# Patient Record
Sex: FEMALE | Race: WHITE | HISPANIC OR LATINO | Employment: STUDENT | URBAN - METROPOLITAN AREA
[De-identification: names, ages, dates, MRNs, and addresses within clinical notes are randomized per-mention and may not be internally consistent; named-entity substitution may affect disease eponyms.]

---
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
## 2022-04-26 ENCOUNTER — PATIENT MESSAGE (OUTPATIENT)
Dept: OBSTETRICS AND GYNECOLOGY | Facility: CLINIC | Age: 22
End: 2022-04-26
Payer: MEDICAID

## 2022-05-11 ENCOUNTER — PATIENT MESSAGE (OUTPATIENT)
Dept: OBSTETRICS AND GYNECOLOGY | Facility: CLINIC | Age: 22
End: 2022-05-11
Payer: MEDICAID

## 2022-05-24 ENCOUNTER — OFFICE VISIT (OUTPATIENT)
Dept: URGENT CARE | Facility: CLINIC | Age: 22
End: 2022-05-24
Payer: MEDICAID

## 2022-05-24 VITALS
OXYGEN SATURATION: 96 % | WEIGHT: 121 LBS | DIASTOLIC BLOOD PRESSURE: 79 MMHG | TEMPERATURE: 98 F | HEART RATE: 98 BPM | RESPIRATION RATE: 20 BRPM | BODY MASS INDEX: 21.43 KG/M2 | SYSTOLIC BLOOD PRESSURE: 112 MMHG

## 2022-05-24 DIAGNOSIS — R11.0 NAUSEA: Primary | ICD-10-CM

## 2022-05-24 DIAGNOSIS — R10.30 LOWER ABDOMINAL PAIN: ICD-10-CM

## 2022-05-24 LAB
B-HCG UR QL: NEGATIVE
BILIRUB UR QL STRIP: NEGATIVE
CTP QC/QA: YES
GLUCOSE UR QL STRIP: NEGATIVE
KETONES UR QL STRIP: POSITIVE
LEUKOCYTE ESTERASE UR QL STRIP: NEGATIVE
PH, POC UA: 6 (ref 5–8)
POC BLOOD, URINE: NEGATIVE
POC NITRATES, URINE: NEGATIVE
PROT UR QL STRIP: NEGATIVE
SP GR UR STRIP: 1.02 (ref 1–1.03)
UROBILINOGEN UR STRIP-ACNC: NORMAL (ref 0.1–1.1)

## 2022-05-24 PROCEDURE — 3078F DIAST BP <80 MM HG: CPT | Mod: CPTII,S$GLB,, | Performed by: FAMILY MEDICINE

## 2022-05-24 PROCEDURE — 81025 URINE PREGNANCY TEST: CPT | Mod: S$GLB,,, | Performed by: FAMILY MEDICINE

## 2022-05-24 PROCEDURE — 81003 POCT URINALYSIS, DIPSTICK, AUTOMATED, W/O SCOPE: ICD-10-PCS | Mod: QW,S$GLB,, | Performed by: FAMILY MEDICINE

## 2022-05-24 PROCEDURE — 1159F MED LIST DOCD IN RCRD: CPT | Mod: CPTII,S$GLB,, | Performed by: FAMILY MEDICINE

## 2022-05-24 PROCEDURE — 3074F PR MOST RECENT SYSTOLIC BLOOD PRESSURE < 130 MM HG: ICD-10-PCS | Mod: CPTII,S$GLB,, | Performed by: FAMILY MEDICINE

## 2022-05-24 PROCEDURE — 99203 OFFICE O/P NEW LOW 30 MIN: CPT | Mod: S$GLB,,, | Performed by: FAMILY MEDICINE

## 2022-05-24 PROCEDURE — 81025 POCT URINE PREGNANCY: ICD-10-PCS | Mod: S$GLB,,, | Performed by: FAMILY MEDICINE

## 2022-05-24 PROCEDURE — 1160F PR REVIEW ALL MEDS BY PRESCRIBER/CLIN PHARMACIST DOCUMENTED: ICD-10-PCS | Mod: CPTII,S$GLB,, | Performed by: FAMILY MEDICINE

## 2022-05-24 PROCEDURE — 1160F RVW MEDS BY RX/DR IN RCRD: CPT | Mod: CPTII,S$GLB,, | Performed by: FAMILY MEDICINE

## 2022-05-24 PROCEDURE — 3008F PR BODY MASS INDEX (BMI) DOCUMENTED: ICD-10-PCS | Mod: CPTII,S$GLB,, | Performed by: FAMILY MEDICINE

## 2022-05-24 PROCEDURE — 1159F PR MEDICATION LIST DOCUMENTED IN MEDICAL RECORD: ICD-10-PCS | Mod: CPTII,S$GLB,, | Performed by: FAMILY MEDICINE

## 2022-05-24 PROCEDURE — 3078F PR MOST RECENT DIASTOLIC BLOOD PRESSURE < 80 MM HG: ICD-10-PCS | Mod: CPTII,S$GLB,, | Performed by: FAMILY MEDICINE

## 2022-05-24 PROCEDURE — 81003 URINALYSIS AUTO W/O SCOPE: CPT | Mod: QW,S$GLB,, | Performed by: FAMILY MEDICINE

## 2022-05-24 PROCEDURE — 3008F BODY MASS INDEX DOCD: CPT | Mod: CPTII,S$GLB,, | Performed by: FAMILY MEDICINE

## 2022-05-24 PROCEDURE — 74019 RADEX ABDOMEN 2 VIEWS: CPT | Mod: FY,S$GLB,, | Performed by: RADIOLOGY

## 2022-05-24 PROCEDURE — 74019 XR ABDOMEN FLAT AND ERECT: ICD-10-PCS | Mod: FY,S$GLB,, | Performed by: RADIOLOGY

## 2022-05-24 PROCEDURE — 99203 PR OFFICE/OUTPT VISIT, NEW, LEVL III, 30-44 MIN: ICD-10-PCS | Mod: S$GLB,,, | Performed by: FAMILY MEDICINE

## 2022-05-24 PROCEDURE — 3074F SYST BP LT 130 MM HG: CPT | Mod: CPTII,S$GLB,, | Performed by: FAMILY MEDICINE

## 2022-05-24 RX ORDER — DICYCLOMINE HYDROCHLORIDE 20 MG/1
20 TABLET ORAL
Status: COMPLETED | OUTPATIENT
Start: 2022-05-24 | End: 2022-05-24

## 2022-05-24 RX ORDER — ONDANSETRON 4 MG/1
4 TABLET, ORALLY DISINTEGRATING ORAL EVERY 6 HOURS PRN
Qty: 15 TABLET | Refills: 0 | Status: SHIPPED | OUTPATIENT
Start: 2022-05-24 | End: 2022-07-16

## 2022-05-24 RX ORDER — DICYCLOMINE HYDROCHLORIDE 20 MG/1
20 TABLET ORAL EVERY 6 HOURS
Qty: 30 TABLET | Refills: 1 | Status: SHIPPED | OUTPATIENT
Start: 2022-05-24 | End: 2022-06-23

## 2022-05-24 RX ADMIN — DICYCLOMINE HYDROCHLORIDE 20 MG: 20 TABLET ORAL at 10:05

## 2022-05-24 NOTE — PROGRESS NOTES
Subjective:       Patient ID: Soila Ridley is a 22 y.o. female.    Vitals:  weight is 54.9 kg (121 lb). Her temperature is 98.4 °F (36.9 °C). Her blood pressure is 112/79 and her pulse is 98. Her respiration is 20 and oxygen saturation is 96%.     Chief Complaint: Nausea    Pt is complaining of bad stomach pain that started yesterday.  States had meal outside day before and since then having crampy, sharp pain in mid abdominal area, no radiation, no relation to food,  No diarrhea  No previous hx of  deneis f/c      Abdominal Pain  This is a new problem. The current episode started yesterday. The pain is at a severity of 0/10. The patient is experiencing no pain. Associated symptoms include headaches, myalgias, nausea and vomiting. Patient's medical history does not include UTI.       Gastrointestinal: Positive for abdominal pain, nausea and vomiting.   Musculoskeletal: Positive for muscle ache.   Neurological: Positive for headaches.       Objective:      Physical Exam   Constitutional: She is oriented to person, place, and time. She appears well-developed. She is cooperative.  Non-toxic appearance. She does not appear ill. No distress.   HENT:   Head: Normocephalic and atraumatic.   Ears:   Right Ear: Hearing, tympanic membrane, external ear and ear canal normal.   Left Ear: Hearing, tympanic membrane, external ear and ear canal normal.   Nose: Nose normal. No mucosal edema, rhinorrhea or nasal deformity. No epistaxis. Right sinus exhibits no maxillary sinus tenderness and no frontal sinus tenderness. Left sinus exhibits no maxillary sinus tenderness and no frontal sinus tenderness.   Mouth/Throat: Uvula is midline, oropharynx is clear and moist and mucous membranes are normal. Mucous membranes are moist. No trismus in the jaw. Normal dentition. No uvula swelling. No posterior oropharyngeal erythema. Oropharynx is clear.   Eyes: Conjunctivae and lids are normal. Pupils are equal, round, and reactive to light.  Right eye exhibits no discharge. Left eye exhibits no discharge. No scleral icterus. Extraocular movement intact   Neck: Trachea normal and phonation normal. Neck supple.   Cardiovascular: Normal rate, regular rhythm, normal heart sounds and normal pulses.   Pulmonary/Chest: Effort normal and breath sounds normal. No respiratory distress.   Abdominal: Normal appearance and bowel sounds are normal. She exhibits no distension, no pulsatile midline mass and no mass. Soft. There is no rebound and no guarding. No hernia.   Musculoskeletal: Normal range of motion.         General: No deformity. Normal range of motion.   Neurological: She is alert and oriented to person, place, and time. She exhibits normal muscle tone. Coordination normal.   Skin: Skin is warm, dry, intact, not diaphoretic and not pale.   Psychiatric: Her speech is normal and behavior is normal. Judgment and thought content normal.   Nursing note and vitals reviewed.        Assessment:       1. Nausea    2. Lower abdominal pain          Plan:         Nausea  -     POCT Urinalysis, Dipstick, Automated, W/O Scope  -     POCT urine pregnancy  -     X-Ray Abdomen Flat And Erect; Future; Expected date: 05/24/2022    Lower abdominal pain  -     X-Ray Abdomen Flat And Erect; Future; Expected date: 05/24/2022    Other orders  -     dicyclomine tablet 20 mg  -     dicyclomine (BENTYL) 20 mg tablet; Take 1 tablet (20 mg total) by mouth every 6 (six) hours.  Dispense: 30 tablet; Refill: 1  -     ondansetron (ZOFRAN-ODT) 4 MG TbDL; Take 1 tablet (4 mg total) by mouth every 6 (six) hours as needed.  Dispense: 15 tablet; Refill: 0          Pt advised to go to ER if persistent abdominal or worsening of sx

## 2022-07-05 ENCOUNTER — PATIENT MESSAGE (OUTPATIENT)
Dept: OBSTETRICS AND GYNECOLOGY | Facility: CLINIC | Age: 22
End: 2022-07-05
Payer: MEDICAID

## 2022-07-06 ENCOUNTER — PATIENT MESSAGE (OUTPATIENT)
Dept: OBSTETRICS AND GYNECOLOGY | Facility: CLINIC | Age: 22
End: 2022-07-06
Payer: MEDICAID

## 2022-07-07 ENCOUNTER — PATIENT MESSAGE (OUTPATIENT)
Dept: OBSTETRICS AND GYNECOLOGY | Facility: CLINIC | Age: 22
End: 2022-07-07
Payer: MEDICAID

## 2022-07-16 ENCOUNTER — OFFICE VISIT (OUTPATIENT)
Dept: URGENT CARE | Facility: CLINIC | Age: 22
End: 2022-07-16
Payer: MEDICAID

## 2022-07-16 VITALS
BODY MASS INDEX: 20.49 KG/M2 | SYSTOLIC BLOOD PRESSURE: 95 MMHG | WEIGHT: 120 LBS | HEART RATE: 77 BPM | RESPIRATION RATE: 18 BRPM | HEIGHT: 64 IN | DIASTOLIC BLOOD PRESSURE: 66 MMHG | OXYGEN SATURATION: 98 % | TEMPERATURE: 99 F

## 2022-07-16 DIAGNOSIS — L08.9 INFECTED ABRASION OF GREAT TOE OF LEFT FOOT, INITIAL ENCOUNTER: Primary | ICD-10-CM

## 2022-07-16 DIAGNOSIS — S90.412A INFECTED ABRASION OF GREAT TOE OF LEFT FOOT, INITIAL ENCOUNTER: Primary | ICD-10-CM

## 2022-07-16 DIAGNOSIS — L03.032 PARONYCHIA OF GREAT TOE OF LEFT FOOT: ICD-10-CM

## 2022-07-16 PROCEDURE — 3078F DIAST BP <80 MM HG: CPT | Mod: CPTII,S$GLB,, | Performed by: FAMILY MEDICINE

## 2022-07-16 PROCEDURE — 3008F BODY MASS INDEX DOCD: CPT | Mod: CPTII,S$GLB,, | Performed by: FAMILY MEDICINE

## 2022-07-16 PROCEDURE — 3008F PR BODY MASS INDEX (BMI) DOCUMENTED: ICD-10-PCS | Mod: CPTII,S$GLB,, | Performed by: FAMILY MEDICINE

## 2022-07-16 PROCEDURE — 1159F PR MEDICATION LIST DOCUMENTED IN MEDICAL RECORD: ICD-10-PCS | Mod: CPTII,S$GLB,, | Performed by: FAMILY MEDICINE

## 2022-07-16 PROCEDURE — 3074F PR MOST RECENT SYSTOLIC BLOOD PRESSURE < 130 MM HG: ICD-10-PCS | Mod: CPTII,S$GLB,, | Performed by: FAMILY MEDICINE

## 2022-07-16 PROCEDURE — 3078F PR MOST RECENT DIASTOLIC BLOOD PRESSURE < 80 MM HG: ICD-10-PCS | Mod: CPTII,S$GLB,, | Performed by: FAMILY MEDICINE

## 2022-07-16 PROCEDURE — 99213 OFFICE O/P EST LOW 20 MIN: CPT | Mod: S$GLB,,, | Performed by: FAMILY MEDICINE

## 2022-07-16 PROCEDURE — 99213 PR OFFICE/OUTPT VISIT, EST, LEVL III, 20-29 MIN: ICD-10-PCS | Mod: S$GLB,,, | Performed by: FAMILY MEDICINE

## 2022-07-16 PROCEDURE — 3074F SYST BP LT 130 MM HG: CPT | Mod: CPTII,S$GLB,, | Performed by: FAMILY MEDICINE

## 2022-07-16 PROCEDURE — 1159F MED LIST DOCD IN RCRD: CPT | Mod: CPTII,S$GLB,, | Performed by: FAMILY MEDICINE

## 2022-07-16 RX ORDER — CEPHALEXIN 500 MG/1
500 CAPSULE ORAL EVERY 6 HOURS
Qty: 28 CAPSULE | Refills: 0 | Status: SHIPPED | OUTPATIENT
Start: 2022-07-16 | End: 2022-07-23

## 2022-07-16 RX ORDER — DICYCLOMINE HYDROCHLORIDE 10 MG/1
10 CAPSULE ORAL
COMMUNITY

## 2022-07-16 NOTE — PROGRESS NOTES
"Subjective:       Patient ID: Soila Ridley is a 22 y.o. female.    Vitals:  height is 5' 4" (1.626 m) and weight is 54.4 kg (120 lb). Her temperature is 98.6 °F (37 °C). Her blood pressure is 95/66 and her pulse is 77. Her respiration is 18 and oxygen saturation is 98%.     Chief Complaint: Nail Problem    Patient presents to clinic with left great toenail infection/swelling/draining x 3 days.Does not recall any contusion, or fall or any other trauma      Nail Problem  This is a new problem. The current episode started in the past 7 days. The problem has been gradually worsening. Pertinent negatives include no numbness. The symptoms are aggravated by walking. Treatments tried: triple antibiotic ointment. The treatment provided no relief.       Neurological: Negative for numbness.       Objective:      Physical Exam   Constitutional: She is oriented to person, place, and time. She appears well-developed. She is cooperative.  Non-toxic appearance. She does not appear ill. No distress.   HENT:   Head: Normocephalic and atraumatic.   Ears:   Right Ear: Hearing, tympanic membrane, external ear and ear canal normal.   Left Ear: Hearing, tympanic membrane, external ear and ear canal normal.   Nose: Nose normal. No mucosal edema, rhinorrhea or nasal deformity. No epistaxis. Right sinus exhibits no maxillary sinus tenderness and no frontal sinus tenderness. Left sinus exhibits no maxillary sinus tenderness and no frontal sinus tenderness.   Mouth/Throat: Uvula is midline, oropharynx is clear and moist and mucous membranes are normal. Mucous membranes are moist. No trismus in the jaw. Normal dentition. No uvula swelling. No oropharyngeal exudate. Oropharynx is clear.   Eyes: Conjunctivae and lids are normal. Pupils are equal, round, and reactive to light. Right eye exhibits no discharge. Left eye exhibits no discharge. No scleral icterus. Extraocular movement intact   Neck: Trachea normal and phonation normal. Neck supple. "   Cardiovascular: Normal rate, regular rhythm, normal heart sounds and normal pulses.   Pulmonary/Chest: Effort normal and breath sounds normal. No respiratory distress.   Abdominal: Normal appearance and bowel sounds are normal. She exhibits no distension, no pulsatile midline mass and no mass. Soft. There is no abdominal tenderness.   Musculoskeletal: Normal range of motion.         General: No deformity. Normal range of motion.      Comments: Left big toe with minimal swelling, mild clear drainage from base of nail, around matrix  No FB,    Moderate tenderness on palpation     Neurological: She is alert and oriented to person, place, and time. She exhibits normal muscle tone. Coordination normal.   Skin: Skin is warm, dry, intact, not diaphoretic and not pale.   Psychiatric: Her speech is normal and behavior is normal. Judgment and thought content normal.   Nursing note and vitals reviewed.        Assessment:       1. Infected abrasion of great toe of left foot, initial encounter    2. Paronychia of great toe of left foot          Plan:         Infected abrasion of great toe of left foot, initial encounter    Paronychia of great toe of left foot    Other orders  -     cephALEXin (KEFLEX) 500 MG capsule; Take 1 capsule (500 mg total) by mouth every 6 (six) hours. for 7 days  Dispense: 28 capsule; Refill: 0              Advised warm soaks

## 2022-08-02 ENCOUNTER — OFFICE VISIT (OUTPATIENT)
Dept: OBSTETRICS AND GYNECOLOGY | Facility: CLINIC | Age: 22
End: 2022-08-02
Payer: MEDICAID

## 2022-08-02 VITALS
SYSTOLIC BLOOD PRESSURE: 100 MMHG | WEIGHT: 120.56 LBS | BODY MASS INDEX: 20.58 KG/M2 | HEIGHT: 64 IN | DIASTOLIC BLOOD PRESSURE: 62 MMHG

## 2022-08-02 DIAGNOSIS — Z01.419 ENCOUNTER FOR WELL WOMAN EXAM WITH ROUTINE GYNECOLOGICAL EXAM: Primary | ICD-10-CM

## 2022-08-02 DIAGNOSIS — Z30.011 ENCOUNTER FOR INITIAL PRESCRIPTION OF CONTRACEPTIVE PILLS: ICD-10-CM

## 2022-08-02 PROCEDURE — 99395 PREV VISIT EST AGE 18-39: CPT | Mod: S$PBB,,, | Performed by: OBSTETRICS & GYNECOLOGY

## 2022-08-02 PROCEDURE — 99999 PR PBB SHADOW E&M-EST. PATIENT-LVL II: ICD-10-PCS | Mod: PBBFAC,,, | Performed by: OBSTETRICS & GYNECOLOGY

## 2022-08-02 PROCEDURE — 99395 PR PREVENTIVE VISIT,EST,18-39: ICD-10-PCS | Mod: S$PBB,,, | Performed by: OBSTETRICS & GYNECOLOGY

## 2022-08-02 PROCEDURE — 99212 OFFICE O/P EST SF 10 MIN: CPT | Mod: PBBFAC | Performed by: OBSTETRICS & GYNECOLOGY

## 2022-08-02 PROCEDURE — 3074F PR MOST RECENT SYSTOLIC BLOOD PRESSURE < 130 MM HG: ICD-10-PCS | Mod: CPTII,,, | Performed by: OBSTETRICS & GYNECOLOGY

## 2022-08-02 PROCEDURE — 3074F SYST BP LT 130 MM HG: CPT | Mod: CPTII,,, | Performed by: OBSTETRICS & GYNECOLOGY

## 2022-08-02 PROCEDURE — 3008F BODY MASS INDEX DOCD: CPT | Mod: CPTII,,, | Performed by: OBSTETRICS & GYNECOLOGY

## 2022-08-02 PROCEDURE — 1159F PR MEDICATION LIST DOCUMENTED IN MEDICAL RECORD: ICD-10-PCS | Mod: CPTII,,, | Performed by: OBSTETRICS & GYNECOLOGY

## 2022-08-02 PROCEDURE — 3078F DIAST BP <80 MM HG: CPT | Mod: CPTII,,, | Performed by: OBSTETRICS & GYNECOLOGY

## 2022-08-02 PROCEDURE — 99999 PR PBB SHADOW E&M-EST. PATIENT-LVL II: CPT | Mod: PBBFAC,,, | Performed by: OBSTETRICS & GYNECOLOGY

## 2022-08-02 PROCEDURE — 1159F MED LIST DOCD IN RCRD: CPT | Mod: CPTII,,, | Performed by: OBSTETRICS & GYNECOLOGY

## 2022-08-02 PROCEDURE — 3078F PR MOST RECENT DIASTOLIC BLOOD PRESSURE < 80 MM HG: ICD-10-PCS | Mod: CPTII,,, | Performed by: OBSTETRICS & GYNECOLOGY

## 2022-08-02 PROCEDURE — 3008F PR BODY MASS INDEX (BMI) DOCUMENTED: ICD-10-PCS | Mod: CPTII,,, | Performed by: OBSTETRICS & GYNECOLOGY

## 2022-08-02 RX ORDER — NORETHINDRONE ACETATE AND ETHINYL ESTRADIOL 1.5-30(21)
1 KIT ORAL DAILY
Qty: 90 TABLET | Refills: 4 | Status: SHIPPED | OUTPATIENT
Start: 2022-08-02 | End: 2023-02-08

## 2022-08-02 NOTE — PROGRESS NOTES
History & Physical  Gynecology      SUBJECTIVE:     Chief Complaint: Gynecologic Exam       History of Present Illness:    oSila Ridley is a 22 y.o. female  here for annual routine Pap and checkup. No LMP recorded..  She has no unusual complaints.      Pt is on OCPs for contraception.  Pt reports that she is having heavy bleeding during her menses.  Only happening during her off pills.  Pt doesn't want a low dose pill because she lost too much weight with the lower dose pills.  Bleeding will last 5-6 days.  Overall is happy with medication.  Stopped breastfeeding 6 months ago.    denies break through bleeding.   denies vaginal itching or irritation.  denies vaginal discharge.    She is sexually active with 1 partner  She uses oral contraceptives (estrogen/progesterone) for contraception.    History of abnormal pap: No  Last Pap: N/A  Last MMG: No  Last Colonoscopy:  No      Review of patient's allergies indicates:  No Known Allergies    Past Medical History:   Diagnosis Date    Bronchitis 2014     Past Surgical History:   Procedure Laterality Date    KNEE SURGERY       OB History        1    Para   1    Term   1       0    AB   0    Living   1       SAB   0    IAB   0    Ectopic   0    Multiple   0    Live Births   1               Family History   Problem Relation Age of Onset    Diabetes Mother     No Known Problems Father      Social History     Tobacco Use    Smoking status: Never Smoker    Smokeless tobacco: Never Used   Substance Use Topics    Alcohol use: No    Drug use: No       Current Outpatient Medications   Medication Sig    dicyclomine (BENTYL) 10 MG capsule Take 10 mg by mouth 4 (four) times daily before meals and nightly.    norethindrone (ORTHO MICRONOR) 0.35 mg tablet Take 1 tablet (0.35 mg total) by mouth once daily. (Patient not taking: No sig reported)    norethindrone-ethinyl estradiol-iron (MICROGESTIN FE1.5/30) 1.5 mg-30 mcg (21)/75 mg (7) tablet Take 1 tablet  by mouth once daily.     No current facility-administered medications for this visit.         Review of Systems:  Review of Systems   Constitutional: Negative for activity change, appetite change, chills, fatigue, fever and unexpected weight change.   Respiratory: Negative for cough, shortness of breath and wheezing.    Cardiovascular: Negative for chest pain and leg swelling.   Gastrointestinal: Negative for abdominal pain, constipation, diarrhea, nausea and vomiting.   Endocrine: Negative for hair loss and hot flashes.   Genitourinary: Negative for decreased libido, dyspareunia, dysuria, frequency, menstrual problem, pelvic pain, vaginal bleeding, vaginal discharge and vaginal pain.   Integumentary:  Negative for acne, hair changes, nipple discharge and breast skin changes.   Neurological: Negative for headaches.   Psychiatric/Behavioral: Negative for sleep disturbance.   Breast: Negative for mastodynia, nipple discharge and skin changes       OBJECTIVE:     Physical Exam:  Physical Exam  Constitutional:       Appearance: She is well-developed.   HENT:      Head: Normocephalic and atraumatic.   Eyes:      General: No scleral icterus.        Right eye: No discharge.         Left eye: No discharge.      Conjunctiva/sclera: Conjunctivae normal.   Pulmonary:      Effort: Pulmonary effort is normal.      Breath sounds: No stridor.   Chest:      Chest wall: No mass or tenderness.   Breasts: Breasts are symmetrical.      Right: No inverted nipple, mass, nipple discharge, skin change or tenderness.      Left: No inverted nipple, mass, nipple discharge, skin change or tenderness.       Abdominal:      General: There is no distension.      Palpations: Abdomen is soft.      Tenderness: There is no abdominal tenderness.   Genitourinary:     Labia:         Right: No rash, tenderness, lesion or injury.         Left: No rash, tenderness, lesion or injury.       Vagina: Normal.      Cervix: No cervical motion tenderness,  discharge or friability.      Adnexa:         Right: No mass, tenderness or fullness.          Left: No mass, tenderness or fullness.        Comments: Normal external genitalia.  Normal hair distribution.  Urethral meatus normal. No cervical lesions or masses.  No vaginal bleeding noted.  No adnexal or uterine tenderness.  No palpable adnexal masses.  Musculoskeletal:         General: Normal range of motion.   Skin:     General: Skin is warm and dry.   Neurological:      Mental Status: She is alert and oriented to person, place, and time.   Psychiatric:         Behavior: Behavior normal.         Thought Content: Thought content normal.         Judgment: Judgment normal.           ASSESSMENT:       ICD-10-CM ICD-9-CM    1. Encounter for well woman exam with routine gynecological exam  Z01.419 V72.31    2. Encounter for initial prescription of contraceptive pills  Z30.011 V25.01 norethindrone-ethinyl estradiol-iron (MICROGESTIN FE1.5/30) 1.5 mg-30 mcg (21)/75 mg (7) tablet          Plan:      Soila was seen today for gynecologic exam.    Diagnoses and all orders for this visit:    Encounter for well woman exam with routine gynecological exam  - Pap smear starting next year at age 23  - MMG and Cscope not indicated at this time      Encounter for initial prescription of contraceptive pills  - Pt having heavy bleeding but reports that she is overall happy with the medication   - Will send Rx for OCPs.  -     norethindrone-ethinyl estradiol-iron (MICROGESTIN FE1.5/30) 1.5 mg-30 mcg (21)/75 mg (7) tablet; Take 1 tablet by mouth once daily.        No orders of the defined types were placed in this encounter.      Follow up in about 1 year (around 8/2/2023) for annual.    Counseling time: 15 minutes    Celian Hardin

## 2023-02-03 ENCOUNTER — HOSPITAL ENCOUNTER (EMERGENCY)
Facility: HOSPITAL | Age: 23
Discharge: HOME | End: 2023-02-03
Attending: EMERGENCY MEDICINE
Payer: MEDICAID

## 2023-02-03 ENCOUNTER — ANESTHESIA EVENT (OUTPATIENT)
Dept: EMERGENCY MEDICINE | Facility: HOSPITAL | Age: 23
End: 2023-02-03
Payer: MEDICAID

## 2023-02-03 ENCOUNTER — ANESTHESIA (OUTPATIENT)
Dept: EMERGENCY MEDICINE | Facility: HOSPITAL | Age: 23
End: 2023-02-03
Payer: MEDICAID

## 2023-02-03 VITALS
HEART RATE: 98 BPM | SYSTOLIC BLOOD PRESSURE: 105 MMHG | OXYGEN SATURATION: 98 % | TEMPERATURE: 98 F | DIASTOLIC BLOOD PRESSURE: 70 MMHG | RESPIRATION RATE: 17 BRPM | BODY MASS INDEX: 20.08 KG/M2 | WEIGHT: 117 LBS

## 2023-02-03 DIAGNOSIS — G97.1 POST LUMBAR PUNCTURE HEADACHE: Primary | ICD-10-CM

## 2023-02-03 PROCEDURE — 96375 TX/PRO/DX INJ NEW DRUG ADDON: CPT

## 2023-02-03 PROCEDURE — 25000003 PHARM REV CODE 250: Performed by: EMERGENCY MEDICINE

## 2023-02-03 PROCEDURE — 62273 EPIDURAL: ICD-10-PCS | Mod: ,,, | Performed by: ANESTHESIOLOGY

## 2023-02-03 PROCEDURE — 96361 HYDRATE IV INFUSION ADD-ON: CPT | Mod: 59

## 2023-02-03 PROCEDURE — 63600175 PHARM REV CODE 636 W HCPCS: Performed by: EMERGENCY MEDICINE

## 2023-02-03 PROCEDURE — 62273 INJECT EPIDURAL PATCH: CPT | Performed by: ANESTHESIOLOGY

## 2023-02-03 PROCEDURE — 96374 THER/PROPH/DIAG INJ IV PUSH: CPT

## 2023-02-03 PROCEDURE — 99284 EMERGENCY DEPT VISIT MOD MDM: CPT | Mod: 25

## 2023-02-03 RX ORDER — ONDANSETRON 2 MG/ML
4 INJECTION INTRAMUSCULAR; INTRAVENOUS
Status: COMPLETED | OUTPATIENT
Start: 2023-02-03 | End: 2023-02-03

## 2023-02-03 RX ORDER — ONDANSETRON 8 MG/1
8 TABLET, ORALLY DISINTEGRATING ORAL EVERY 8 HOURS PRN
Qty: 12 TABLET | Refills: 0 | Status: SHIPPED | OUTPATIENT
Start: 2023-02-03 | End: 2023-08-11

## 2023-02-03 RX ORDER — KETOROLAC TROMETHAMINE 30 MG/ML
15 INJECTION, SOLUTION INTRAMUSCULAR; INTRAVENOUS
Status: COMPLETED | OUTPATIENT
Start: 2023-02-03 | End: 2023-02-03

## 2023-02-03 RX ORDER — BUTALBITAL, ACETAMINOPHEN AND CAFFEINE 50; 325; 40 MG/1; MG/1; MG/1
1 TABLET ORAL
Status: COMPLETED | OUTPATIENT
Start: 2023-02-03 | End: 2023-02-03

## 2023-02-03 RX ORDER — IBUPROFEN 800 MG/1
800 TABLET ORAL EVERY 8 HOURS PRN
Qty: 30 TABLET | Refills: 0 | Status: SHIPPED | OUTPATIENT
Start: 2023-02-03 | End: 2023-08-11

## 2023-02-03 RX ORDER — ACETAMINOPHEN 500 MG
1000 TABLET ORAL
Status: COMPLETED | OUTPATIENT
Start: 2023-02-03 | End: 2023-02-03

## 2023-02-03 RX ADMIN — BUTALBITAL, ACETAMINOPHEN, AND CAFFEINE 1 TABLET: 50; 325; 40 TABLET ORAL at 12:02

## 2023-02-03 RX ADMIN — SODIUM CHLORIDE 1000 ML: 0.9 INJECTION, SOLUTION INTRAVENOUS at 12:02

## 2023-02-03 RX ADMIN — ONDANSETRON HYDROCHLORIDE 4 MG: 2 INJECTION, SOLUTION INTRAMUSCULAR; INTRAVENOUS at 12:02

## 2023-02-03 RX ADMIN — KETOROLAC TROMETHAMINE 15 MG: 30 INJECTION, SOLUTION INTRAMUSCULAR; INTRAVENOUS at 12:02

## 2023-02-03 RX ADMIN — ACETAMINOPHEN 1000 MG: 500 TABLET ORAL at 03:02

## 2023-02-03 NOTE — ED NOTES
Anestheisa at bedside performing blood patch procedure. Pt c/o back cramping and chill. Pt tolerated procedure well. Instructed pt she needs to lie flat for 2 hours. Pt verbalize understanding.

## 2023-02-03 NOTE — ED NOTES
Present to ED with headache for 3 days after having an LP. Reports taking Nortec and drinking a coke to help with the headache. Denies visual changes. +nausea.

## 2023-02-03 NOTE — ED PROVIDER NOTES
Encounter Date: 2/3/2023     History     Chief Complaint   Patient presents with    Headache     Pt had a LP done in [place] 3 days ago and is now complaining of a really bad headache and nausea. Pt states that standing makes it worse.      Patient presents to emergency department complaining of a headache after having an LP done 3 days ago.  She complains of a constant generalized headache that has been worsening over the past 3 days.  She reports it is associated with nausea but no vomiting.  No fevers or neck stiffness.  Patient has a history of my insulin reports this feels similar.    Review of patient's allergies indicates:  No Known Allergies  Past Medical History:   Diagnosis Date    Bronchitis 2014     Past Surgical History:   Procedure Laterality Date    KNEE SURGERY       Family History   Problem Relation Age of Onset    Diabetes Mother     No Known Problems Father      Social History     Tobacco Use    Smoking status: Never    Smokeless tobacco: Never   Substance Use Topics    Alcohol use: No    Drug use: No     Review of Systems   Neurological:  Positive for headaches.   All other systems reviewed and are negative.    Physical Exam     Initial Vitals [02/03/23 1125]   BP Pulse Resp Temp SpO2   107/68 90 16 98.1 °F (36.7 °C) 97 %      MAP       --         Vitals:    02/03/23 1125   BP: 107/68   Pulse: 90   Resp: 16   Temp: 98.1 °F (36.7 °C)   TempSrc: Oral   SpO2: 97%   Weight: 53.1 kg (117 lb)     Physical Exam    Constitutional: She appears well-developed and well-nourished.   HENT:   Head: Normocephalic and atraumatic.   Eyes: EOM are normal. Pupils are equal, round, and reactive to light.   Neck:   Normal range of motion.  Cardiovascular:  Normal rate, regular rhythm and normal heart sounds.           Pulmonary/Chest: Breath sounds normal. No respiratory distress. She has no wheezes.   Abdominal: Abdomen is soft. She exhibits no distension. There is no abdominal tenderness.   Musculoskeletal:          General: No edema. Normal range of motion.      Cervical back: Normal range of motion.     Neurological: She is alert and oriented to person, place, and time. No cranial nerve deficit.   Skin: Skin is warm and dry.   Psychiatric: She has a normal mood and affect. Thought content normal.       ED Course   Procedures                 MDM  History Acquisition   Additional history was not acquired from other historians.  The patient's list of active medical problems, social history, medications, and allergies as documented per RN staff has been reviewed.     Differential Diagnoses   Based on available information and the initial assessment, the working differential diagnoses considered during this evaluation include but are not limited to post LP headache, migraine, meningitis .      LABS   Labs Reviewed - No data to display  Notable findings from our independent review of the labs ordered include NA.    Imaging     Imaging Results    None          A radiology report was not available for my review at the time of the encounter.    EKG        Additional Consideration   Additional testing NA was not considered during the course of this workup.  Comorbidities taken into consideration during the patient's evaluation and treatment include NA.    Social determinants of health taken into consideration during development of our treatment plan include NA.    Medications   acetaminophen tablet 1,000 mg (has no administration in time range)   sodium chloride 0.9% bolus 1,000 mL 1,000 mL (1,000 mLs Intravenous New Bag 2/3/23 1222)   ondansetron injection 4 mg (4 mg Intravenous Given 2/3/23 1211)   ketorolac injection 15 mg (15 mg Intravenous Given 2/3/23 1211)   butalbital-acetaminophen-caffeine -40 mg per tablet 1 tablet (1 tablet Oral Given 2/3/23 1211)                 ED Management/Discussion     Patient presented with symptoms consistent with post LP headache.  Anesthesia was consulted and came to the emergency department  to perform a blood patch.  Patient was given Toradol Fioricet Zofran and IV fluids.  She reports her symptoms have significantly improved.  She is appropriate for discharge at this time      CLINICAL IMPRESSION  1. Post lumbar puncture headache         ED Disposition Condition    Discharge Stable              Kenny Domingo MD  02/03/23 1500

## 2023-02-03 NOTE — ANESTHESIA PREPROCEDURE EVALUATION
02/03/2023  Soila Ridley is a 22 y.o., female 3 days s/p LP in [place]. Presented to ED with c/o headache, 10/10 sitting, 0/10 supine. Associated N/V, denies neck pain or stiffness, photophobia or hypacusis. She has taken pain medication at home without relief, tried caffeine but is vomiting. Discussed PDPH conservative management vs EBP & patient would like to proceed with blood patch. Risks, benefits discussed, consents signed.   Richelle Ivey MD      Pre-op Assessment          Review of Systems          .

## 2023-02-03 NOTE — ANESTHESIA PROCEDURE NOTES
Epidural    Patient location during procedure: ED  Block not for primary anesthetic.  Reason for block: post dural puncture headache   Post-op Pain Location: HA b/l  Start time: 2/3/2023 12:30 PM  Timeout: 2/3/2023 12:30 PM  End time: 2/3/2023 12:40 PM    Staffing  Performing Provider: Richelle Ivey MD  Authorizing Provider: Richelle Ivey MD        Preanesthetic Checklist  Completed: patient identified, IV checked, risks and benefits discussed, pre-op evaluation, timeout performed, anesthesia consent given and hand hygiene performed  Preparation  Patient position: sitting  Prep: ChloraPrep  Patient monitoring: Pulse Ox and Blood Pressure Block not for primary anesthetic.  Epidural  Skin Anesthetic: lidocaine 1%  Skin Wheal: 3 mL  Administration type: single shot  Approach: midline  Interspace: L4-5    Injection technique: GHAZAL saline  Needle and Epidural Catheter  Needle type: Tuohy   Needle gauge: 17  Needle length: 3.5 inches  Insertion Attempts: 1  Needle localization: anatomical landmarks  Additional Notes  L AC prepped & draped in sterile fashion, sterile 20G PIV inserted. Upon GHAZAL 20mL sterile blood aspirated and slowly injected into epidural space. Patient noted back & buttock cramping towards end of procedure, tolerated well. Instructed patient to lay flat 2 hours post-procedure. HA reported improved to 2/10 following procedure.

## 2023-02-09 ENCOUNTER — PATIENT MESSAGE (OUTPATIENT)
Dept: OBSTETRICS AND GYNECOLOGY | Facility: CLINIC | Age: 23
End: 2023-02-09
Payer: MEDICAID

## 2023-03-27 ENCOUNTER — PATIENT MESSAGE (OUTPATIENT)
Dept: NEUROLOGY | Facility: CLINIC | Age: 23
End: 2023-03-27
Payer: MEDICAID

## 2023-03-27 ENCOUNTER — TELEPHONE (OUTPATIENT)
Dept: NEUROLOGY | Facility: CLINIC | Age: 23
End: 2023-03-27
Payer: MEDICAID

## 2023-04-11 ENCOUNTER — TELEPHONE (OUTPATIENT)
Dept: NEUROLOGY | Facility: CLINIC | Age: 23
End: 2023-04-11
Payer: MEDICAID

## 2023-04-11 NOTE — TELEPHONE ENCOUNTER
----- Message from Carolin Llanos sent at 4/11/2023  2:08 PM CDT -----  Regarding: Pt Advice  Contact: Pt [phone]  Pt is calling stated that provider is relocating . Please Clarify and Call

## 2023-05-11 ENCOUNTER — LAB VISIT (OUTPATIENT)
Dept: LAB | Facility: HOSPITAL | Age: 23
End: 2023-05-11
Attending: STUDENT IN AN ORGANIZED HEALTH CARE EDUCATION/TRAINING PROGRAM
Payer: MEDICAID

## 2023-05-11 ENCOUNTER — OFFICE VISIT (OUTPATIENT)
Dept: NEUROLOGY | Facility: CLINIC | Age: 23
End: 2023-05-11
Payer: MEDICAID

## 2023-05-11 VITALS
DIASTOLIC BLOOD PRESSURE: 75 MMHG | SYSTOLIC BLOOD PRESSURE: 109 MMHG | WEIGHT: 117.06 LBS | HEART RATE: 81 BPM | BODY MASS INDEX: 19.99 KG/M2 | HEIGHT: 64 IN

## 2023-05-11 DIAGNOSIS — R93.0 RADIOLOGICALLY ISOLATED SYNDROME: ICD-10-CM

## 2023-05-11 DIAGNOSIS — G35 MULTIPLE SCLEROSIS: ICD-10-CM

## 2023-05-11 DIAGNOSIS — F40.240 CLAUSTROPHOBIA: Primary | ICD-10-CM

## 2023-05-11 LAB — 25(OH)D3+25(OH)D2 SERPL-MCNC: 41 NG/ML (ref 30–96)

## 2023-05-11 PROCEDURE — G2211 COMPLEX E/M VISIT ADD ON: HCPCS | Mod: S$PBB,,, | Performed by: STUDENT IN AN ORGANIZED HEALTH CARE EDUCATION/TRAINING PROGRAM

## 2023-05-11 PROCEDURE — 82607 VITAMIN B-12: CPT | Performed by: STUDENT IN AN ORGANIZED HEALTH CARE EDUCATION/TRAINING PROGRAM

## 2023-05-11 PROCEDURE — 82306 VITAMIN D 25 HYDROXY: CPT | Performed by: STUDENT IN AN ORGANIZED HEALTH CARE EDUCATION/TRAINING PROGRAM

## 2023-05-11 PROCEDURE — 3078F DIAST BP <80 MM HG: CPT | Mod: CPTII,,, | Performed by: STUDENT IN AN ORGANIZED HEALTH CARE EDUCATION/TRAINING PROGRAM

## 2023-05-11 PROCEDURE — 83921 ORGANIC ACID SINGLE QUANT: CPT | Performed by: STUDENT IN AN ORGANIZED HEALTH CARE EDUCATION/TRAINING PROGRAM

## 2023-05-11 PROCEDURE — 99999 PR PBB SHADOW E&M-EST. PATIENT-LVL IV: CPT | Mod: PBBFAC,,, | Performed by: STUDENT IN AN ORGANIZED HEALTH CARE EDUCATION/TRAINING PROGRAM

## 2023-05-11 PROCEDURE — 99205 OFFICE O/P NEW HI 60 MIN: CPT | Mod: S$PBB,,, | Performed by: STUDENT IN AN ORGANIZED HEALTH CARE EDUCATION/TRAINING PROGRAM

## 2023-05-11 PROCEDURE — 86363 MOG-IGG1 ANTB FLO CYTMTRY EA: CPT | Performed by: STUDENT IN AN ORGANIZED HEALTH CARE EDUCATION/TRAINING PROGRAM

## 2023-05-11 PROCEDURE — 86053 AQAPRN-4 ANTB FLO CYTMTRY EA: CPT | Performed by: STUDENT IN AN ORGANIZED HEALTH CARE EDUCATION/TRAINING PROGRAM

## 2023-05-11 PROCEDURE — 99214 OFFICE O/P EST MOD 30 MIN: CPT | Mod: PBBFAC | Performed by: STUDENT IN AN ORGANIZED HEALTH CARE EDUCATION/TRAINING PROGRAM

## 2023-05-11 PROCEDURE — 3008F BODY MASS INDEX DOCD: CPT | Mod: CPTII,,, | Performed by: STUDENT IN AN ORGANIZED HEALTH CARE EDUCATION/TRAINING PROGRAM

## 2023-05-11 PROCEDURE — 3074F SYST BP LT 130 MM HG: CPT | Mod: CPTII,,, | Performed by: STUDENT IN AN ORGANIZED HEALTH CARE EDUCATION/TRAINING PROGRAM

## 2023-05-11 PROCEDURE — 86038 ANTINUCLEAR ANTIBODIES: CPT | Performed by: STUDENT IN AN ORGANIZED HEALTH CARE EDUCATION/TRAINING PROGRAM

## 2023-05-11 PROCEDURE — 36415 COLL VENOUS BLD VENIPUNCTURE: CPT | Performed by: STUDENT IN AN ORGANIZED HEALTH CARE EDUCATION/TRAINING PROGRAM

## 2023-05-11 RX ORDER — DIAZEPAM 5 MG/1
5 TABLET ORAL
Qty: 2 TABLET | Refills: 0 | Status: SHIPPED | OUTPATIENT
Start: 2023-05-11 | End: 2024-02-14

## 2023-05-11 NOTE — PROGRESS NOTES
"[facility]  New Patient Visit      Disease Summary     Principle neurological diagnosis: RIS    Date of symptom onset: 12/2022  Date of diagnosis: 5/2023  Disease type at diagnosis: RIS  Disease type currently: RIS  Previous therapy: NA  Current therapy: NA  Last MRI Brain: 12/20/22  Last MRI C-spine: NA  Last MRI T-spine: NA  CSF 01/31/2023:  2 white, 0 red, protein 26, glucose 48, 2 unique oligoclonal bands, normal myelin basic protein, IgG index 0.37  JCV status: NA  Other relevant labs and tests: NA    History:     She has headaches since she was little - triggered by smells usually, light sensitivity, nausea. Denies visual auras. She has been followed by neurology for this and sees ophthalmology intermittently. Ophthalmology exams by Dr. Gregorio has been normal until earlier this year when she was told that her ROBERTO was "swollen." She was referred to Dr. Carrera retinal specialist for further investigation, and she was told that her eye changes were stable.     MRI brain/orbits 12/20/22 MRI orbits showed T2 hyperintensities in L periventricular white matter. No orbit issues. No abnormal contrast.     Her migraine specialist ordered LP 1/31/23 that demonstrated 2 unique CSF OCBs. She had post LP headache which has resolved since.     Denies any current symptoms. She wakes up with arm tingling but thinks it's related to sleep posture (sleeps with 18 month at home)  She has short-term memory loss.     She takes exedrin, nurtec, fioricet PRN. She used to be on Topamax which helped significantly.     She has anxiety.     She was told she had low vitamin B12 and D.     She denies weakness, dysphagia, dysarthria, spasticity, visual changes, gait disturbance, incoordination, pain, heat sensitivity, fatigue, bladder and bowel dysfunction    ROS:     A complete 9 system ROS was reviewed by me and otherwise negative .     Past Medical History:   Diagnosis Date    Anxiety disorder, unspecified     " "Bronchitis 01/01/2014       Past Surgical History:   Procedure Laterality Date    KNEE SURGERY         Family History   Problem Relation Age of Onset    Diabetes Mother     Migraines Mother     Migraines Father     Multiple sclerosis Neg Hx        Social History     Socioeconomic History    Marital status: Single   Tobacco Use    Smoking status: Never    Smokeless tobacco: Never   Substance and Sexual Activity    Alcohol use: No    Drug use: No    Sexual activity: Yes     Birth control/protection: OCP   Social History Narrative    Lives with parents and 1 brother and 1 sister.    2 dogs.       Review of patient's allergies indicates:  No Known Allergies    Living arrangements - the patient lives with their family.        Exam:     Vitals:    05/11/23 1210   BP: 109/75   Pulse: 81   Weight: 53.1 kg (117 lb 1 oz)   Height: 5' 4" (1.626 m)          In general, the patient is well nourished and appears to be in no acute distress.    Blurred disc margins on R on fundoscopic exam, normal in L.     MENTAL STATUS: language is fluent, normal verbal comprehension, short-term and remote memory is intact, attention is normal, patient is alert and oriented x 3, fund of knowlege is appropriate by vocabulary.     CRANIAL NERVE EXAM:  There is no intrernuclear ophthalmoplegia.  Extraocular muscles are intact. Pupils are equal, round, and reactive to light. No facial asymmetry. Facial sensation is intact bilaterally. There is no dysarthria. Uvula is midline, and palate moves symmetrically. Shoulder shrug intact bilaterlly. Tongue protrusion is midline. Hearing is intact to finger rub bilaterally. Neck is supple with full ROM  No Lhermitte's    MOTOR EXAM: Normal bulk and tone throughout UE and LE bilaterally.   No pronator drift; rapid sequential movements are normal;      Strength:  R deltoid 5/5, L deltoid 5/5  R biceps 5/5, L biceps 5/5  R triceps 5/5, L triceps 5/5  R finger flexors 5/5, L finger flexors 5/5  R finger " "extensors 5/5, L finger extensors 5/5  R finger abductors 5/5, L finger abductors 5/5  R  hip flexors 5/5, L hip flexors 5/5  R  hip extensors 5/5, L hip extensors 5/5  R knee extensors 5/5, L knee extensors 5/5  R knee flexors 5/5, L knee flexors 5/5  R ankle dorsiflexors 5/5, L ankle dorsiflexors 5/5  R ankle plantarflexors 5/5, L ankle plantarflexors 5/5    REFLEXES: 2+ and symmetric throughout in all four extremeties; toes are down bilaterally;     SENSORY EXAM: Normal to light touch, vibration, pinprick throughout.    COORDINATION: Normal finger-to-nose exam. Normal heel-to-shin exam.    GAIT: Narrow based and stable. Able to heel walk, toe walk, and perform tandem gait.     Negative Romberg's      Imaging (personally reviewed):     MRI brain 12/20/22          Labs:     Lab Results   Component Value Date    [id number] 41 05/11/2023     No results found for: "JCVINDEX", "JCVANTIBODY"  No results found for: "VM2NBWTZ", "ABSOLUTECD3", "NR6SZMJS", "ABSOLUTECD8", "WT8HCTDO", "ABSOLUTECD4", "LABCD48"  Lab Results   Component Value Date    WBC 5.76 08/16/2023    RBC 4.34 08/16/2023    HGB 13.3 08/16/2023    HCT 39.9 08/16/2023    MCV 92 08/16/2023    MCH 30.6 08/16/2023    MCHC 33.3 08/16/2023    RDW 11.9 08/16/2023     08/16/2023    MPV 9.0 (L) 08/16/2023    GRAN 3.3 08/16/2023    GRAN 56.6 08/16/2023    LYMPH 1.9 08/16/2023    LYMPH 33.5 08/16/2023    MONO 0.5 08/16/2023    MONO 8.5 08/16/2023    EOS 0.1 08/16/2023    BASO 0.02 08/16/2023    EOSINOPHIL 0.9 08/16/2023    BASOPHIL 0.3 08/16/2023     Sodium   Date Value Ref Range Status   08/16/2023 139 136 - 145 mmol/L Final     Potassium   Date Value Ref Range Status   08/16/2023 3.7 3.5 - 5.1 mmol/L Final     Chloride   Date Value Ref Range Status   08/16/2023 107 95 - 110 mmol/L Final     CO2   Date Value Ref Range Status   08/16/2023 22 (L) 23 - 29 mmol/L Final     Glucose   Date Value Ref Range Status   08/16/2023 115 (H) 70 - 110 mg/dL Final     BUN "   Date Value Ref Range Status   08/16/2023 8 6 - 20 mg/dL Final     Creatinine   Date Value Ref Range Status   08/16/2023 0.9 0.5 - 1.4 mg/dL Final     Calcium   Date Value Ref Range Status   08/16/2023 9.6 8.7 - 10.5 mg/dL Final     Total Protein   Date Value Ref Range Status   08/16/2023 8.1 6.0 - 8.4 g/dL Final     Albumin   Date Value Ref Range Status   08/16/2023 4.2 3.5 - 5.2 g/dL Final     Total Bilirubin   Date Value Ref Range Status   08/16/2023 0.4 0.1 - 1.0 mg/dL Final     Comment:     For infants and newborns, interpretation of results should be based  on gestational age, weight and in agreement with clinical  observations.    Premature Infant recommended reference ranges:  Up to 24 hours.............<8.0 mg/dL  Up to 48 hours............<12.0 mg/dL  3-5 days..................<15.0 mg/dL  6-29 days.................<15.0 mg/dL       Alkaline Phosphatase   Date Value Ref Range Status   08/16/2023 62 55 - 135 U/L Final     AST   Date Value Ref Range Status   08/16/2023 17 10 - 40 U/L Final     ALT   Date Value Ref Range Status   08/16/2023 14 10 - 44 U/L Final     Anion Gap   Date Value Ref Range Status   08/16/2023 10 8 - 16 mmol/L Final     eGFR if    Date Value Ref Range Status   04/04/2021 >60 >60 mL/min/1.73 m^2 Final     eGFR if non    Date Value Ref Range Status   04/04/2021 >60 >60 mL/min/1.73 m^2 Final     Comment:     Calculation used to obtain the estimated glomerular filtration  rate (eGFR) is the CKD-EPI equation.                   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 Will stage her lesion burden and determine if need for DMT initiation. Also labs to rule out mimics. Obtain eye exam results from her outside ophthalmologist.   -Imaging: MRI spine in June 2023  -Disease Modifying Therapies:NA for now, if she  does have spinal cord lesions may consider starting DMT due to higher risk for disability  Symptom management   -NA  Plan discussed and questions were answered to satisfaction.  Return to clinic in 3 months with Beulah AMATO MULTIPLE SCLEROSIS IMPRESSION:   Number of relapses in the past year?:  0  Clinical Progression:  Clinically Stable  MRI Progression:  Stable  MS Classification:  Radiologically isolated syndrome  DMT:  No change in management  Symptom Management:  No change in symptom management             Total time spent with the patient: 65 minutes, including face to face consultation, chart review and coordination of care, on the day of the visit. This includes face to face time and non-face to face time preparing to see the patient (eg, review of tests), obtaining and/or reviewing separately obtained history, documenting clinical information in the electronic or other health record, independently interpreting resultsand communicating results to the patient/family/caregiver, or care coordination.         Kathleen Johnson MD, MSc  Attending neurologist

## 2023-05-12 LAB — ANA SER-ACNC: NORMAL

## 2023-05-13 LAB — VIT B12 SERPL-MCNC: 172 NG/L (ref 180–914)

## 2023-05-16 LAB — METHYLMALONATE SERPL-SCNC: 0.1 NMOL/ML

## 2023-05-17 ENCOUNTER — DOCUMENTATION ONLY (OUTPATIENT)
Dept: NEUROLOGY | Facility: CLINIC | Age: 23
End: 2023-05-17
Payer: MEDICAID

## 2023-05-19 LAB
CNS DEMYELINATING DISEASE EVAL: NORMAL
MOG-IGG1: NEGATIVE
NMO/AQP4 FACS,S: NEGATIVE

## 2023-06-14 ENCOUNTER — PATIENT MESSAGE (OUTPATIENT)
Dept: NEUROLOGY | Facility: CLINIC | Age: 23
End: 2023-06-14
Payer: MEDICAID

## 2023-06-15 ENCOUNTER — HOSPITAL ENCOUNTER (OUTPATIENT)
Dept: RADIOLOGY | Facility: HOSPITAL | Age: 23
Discharge: HOME | End: 2023-06-15
Attending: STUDENT IN AN ORGANIZED HEALTH CARE EDUCATION/TRAINING PROGRAM
Payer: MEDICAID

## 2023-06-15 ENCOUNTER — TELEPHONE (OUTPATIENT)
Dept: NEUROLOGY | Facility: CLINIC | Age: 23
End: 2023-06-15
Payer: MEDICAID

## 2023-06-15 DIAGNOSIS — R93.0 RADIOLOGICALLY ISOLATED SYNDROME: ICD-10-CM

## 2023-06-15 PROCEDURE — 70553 MRI BRAIN STEM W/O & W/DYE: CPT | Mod: TC

## 2023-06-15 PROCEDURE — 25500020 PHARM REV CODE 255: Performed by: STUDENT IN AN ORGANIZED HEALTH CARE EDUCATION/TRAINING PROGRAM

## 2023-06-15 PROCEDURE — 70553 MRI BRAIN STEM W/O & W/DYE: CPT | Mod: 26,,, | Performed by: RADIOLOGY

## 2023-06-15 PROCEDURE — 72156 MRI NECK SPINE W/O & W/DYE: CPT | Mod: 26,,, | Performed by: RADIOLOGY

## 2023-06-15 PROCEDURE — 70553 MRI BRAIN DEMYELINATING W/ WO CONTRAST: ICD-10-PCS | Mod: 26,,, | Performed by: RADIOLOGY

## 2023-06-15 PROCEDURE — 72156 MRI NECK SPINE W/O & W/DYE: CPT | Mod: TC

## 2023-06-15 PROCEDURE — A9585 GADOBUTROL INJECTION: HCPCS | Performed by: STUDENT IN AN ORGANIZED HEALTH CARE EDUCATION/TRAINING PROGRAM

## 2023-06-15 PROCEDURE — 72156 MRI CERVICAL SPINE DEMYELINATING W W/O CONTRAST: ICD-10-PCS | Mod: 26,,, | Performed by: RADIOLOGY

## 2023-06-15 RX ORDER — GADOBUTROL 604.72 MG/ML
6 INJECTION INTRAVENOUS
Status: COMPLETED | OUTPATIENT
Start: 2023-06-15 | End: 2023-06-15

## 2023-06-15 RX ADMIN — GADOBUTROL 6 ML: 604.72 INJECTION INTRAVENOUS at 04:06

## 2023-08-11 ENCOUNTER — OFFICE VISIT (OUTPATIENT)
Dept: NEUROLOGY | Facility: CLINIC | Age: 23
End: 2023-08-11
Payer: MEDICAID

## 2023-08-11 VITALS
HEART RATE: 82 BPM | DIASTOLIC BLOOD PRESSURE: 68 MMHG | SYSTOLIC BLOOD PRESSURE: 102 MMHG | BODY MASS INDEX: 20.11 KG/M2 | WEIGHT: 117.81 LBS | HEIGHT: 64 IN

## 2023-08-11 DIAGNOSIS — R93.0 RADIOLOGICALLY ISOLATED SYNDROME: Primary | ICD-10-CM

## 2023-08-11 DIAGNOSIS — F41.9 ANXIETY: ICD-10-CM

## 2023-08-11 PROCEDURE — 99204 PR OFFICE/OUTPT VISIT, NEW, LEVL IV, 45-59 MIN: ICD-10-PCS | Mod: S$PBB,,,

## 2023-08-11 PROCEDURE — 1159F MED LIST DOCD IN RCRD: CPT | Mod: CPTII,,,

## 2023-08-11 PROCEDURE — 99213 OFFICE O/P EST LOW 20 MIN: CPT | Mod: PBBFAC

## 2023-08-11 PROCEDURE — 3008F PR BODY MASS INDEX (BMI) DOCUMENTED: ICD-10-PCS | Mod: CPTII,,,

## 2023-08-11 PROCEDURE — 3074F PR MOST RECENT SYSTOLIC BLOOD PRESSURE < 130 MM HG: ICD-10-PCS | Mod: CPTII,,,

## 2023-08-11 PROCEDURE — 3074F SYST BP LT 130 MM HG: CPT | Mod: CPTII,,,

## 2023-08-11 PROCEDURE — 99999 PR PBB SHADOW E&M-EST. PATIENT-LVL III: ICD-10-PCS | Mod: PBBFAC,,,

## 2023-08-11 PROCEDURE — 1159F PR MEDICATION LIST DOCUMENTED IN MEDICAL RECORD: ICD-10-PCS | Mod: CPTII,,,

## 2023-08-11 PROCEDURE — 99999 PR PBB SHADOW E&M-EST. PATIENT-LVL III: CPT | Mod: PBBFAC,,,

## 2023-08-11 PROCEDURE — 3008F BODY MASS INDEX DOCD: CPT | Mod: CPTII,,,

## 2023-08-11 PROCEDURE — 3078F DIAST BP <80 MM HG: CPT | Mod: CPTII,,,

## 2023-08-11 PROCEDURE — 3078F PR MOST RECENT DIASTOLIC BLOOD PRESSURE < 80 MM HG: ICD-10-PCS | Mod: CPTII,,,

## 2023-08-11 PROCEDURE — 99204 OFFICE O/P NEW MOD 45 MIN: CPT | Mod: S$PBB,,,

## 2023-08-11 RX ORDER — BUTALBITAL, ACETAMINOPHEN AND CAFFEINE 50; 325; 40 MG/1; MG/1; MG/1
1 TABLET ORAL 2 TIMES DAILY PRN
COMMUNITY
Start: 2023-04-20

## 2023-08-11 RX ORDER — CETIRIZINE HYDROCHLORIDE 10 MG/1
10 TABLET ORAL
COMMUNITY
Start: 2023-06-07

## 2023-08-11 NOTE — PROGRESS NOTES
Patient ID: Soila Ridley is a 23 y.o. female who presents today for a routine clinic visit for RIS.  She was last seen by Dr. Johnson on 5/5/2023.  The history was provided by the patient. She is accompanied by her grandmother.     Principle neurological diagnosis: RIS     Date of symptom onset: 12/2022  Date of diagnosis: 5/2023  Disease type at diagnosis: RIS  Disease type currently: RIS  Previous therapy: NA  Current therapy: NA  Vitamin D Dose: 2000IU daily   Last MRI Brain: 6/15/2023 - stable  Last MRI C-spine: 6/15/2023 - no lesions  Last MRI T-spine: NA  CSF 01/31/2023:  2 white, 0 red, protein 26, glucose 48, 2 unique oligoclonal bands, normal myelin basic protein, IgG index 0.37  JCV status: NA  Other relevant labs and tests: 5/11/2023: vitamin D - 41, NMO and MOG - negative, B12 - 172    Subjective:     She says that she has been having some more anxiety lately, but she is talking to her therapist about it. Her therapist believes this may be hormonal and maybe linked to her cycle.  She tracking anxiety symptoms with her cycle and will contact her GYN for blood work.     She is feeling stable with no new symptoms.     Her eyes and her headaches are doing better. She takes fioricet only when needed, not often at all. She states that it is prescribed to take two and she only takes one.       She denies weakness, sensory changes, dysphagia, dysarthria, spasticity, visual changes, cognitive changes, gait disturbance, incoordination, pain, heat sensitivity, fatigue, bladder and bowel dysfunction.       SOCIAL HISTORY  Living arrangements - the patient lives with their family.  Social History     Socioeconomic History    Marital status: Single   Tobacco Use    Smoking status: Never    Smokeless tobacco: Never   Substance and Sexual Activity    Alcohol use: No    Drug use: No    Sexual activity: Yes     Birth control/protection: OCP   Social History Narrative    Lives with parents and 1 brother and 1 sister.    2  dogs.       Current Outpatient Medications on File Prior to Visit   Medication Sig Dispense Refill    butalbital-acetaminophen-caffeine -40 mg (FIORICET, ESGIC) -40 mg per tablet Take 1 tablet by mouth 2 (two) times daily as needed.      cetirizine (ZYRTEC) 10 MG tablet Take 10 mg by mouth.      dicyclomine (BENTYL) 10 MG capsule Take 10 mg by mouth 4 (four) times daily before meals and nightly.      norethindrone-ethinyl estradiol-iron (JUNEL FE 1.5/30, 28,) 1.5 mg-30 mcg (21)/75 mg (7) tablet TAKE 1 TABLET BY MOUTH EVERY DAY 84 tablet 1    diazePAM (VALIUM) 5 MG tablet Take 1 tablet (5 mg total) by mouth as needed (Claustrophobia and anxiety in MRI). 2 tablet 0    [DISCONTINUED] ibuprofen (ADVIL,MOTRIN) 800 MG tablet Take 1 tablet (800 mg total) by mouth every 8 (eight) hours as needed for Pain. 30 tablet 0    [DISCONTINUED] norethindrone (ORTHO MICRONOR) 0.35 mg tablet Take 1 tablet (0.35 mg total) by mouth once daily. (Patient not taking: No sig reported) 30 tablet 11    [DISCONTINUED] ondansetron (ZOFRAN-ODT) 8 MG TbDL Take 1 tablet (8 mg total) by mouth every 8 (eight) hours as needed (nausea). 12 tablet 0     No current facility-administered medications on file prior to visit.       Objective:     1. 25 foot timed walk:  Timed 25 Foot Walk: 5/11/2023 8/11/2023   Did patient wear an AFO? No No   Was assistive device used? No No   Time for 25 Foot Walk (seconds) 3.7 3.78   Time for 25 Foot Walk (seconds) 3.89 3.78           NEURO EXAM    In general, the patient is well nourished and appears to be in no acute distress.    MENTAL STATUS: language is fluent, normal verbal comprehension, short-term and remote memory is intact, attention is normal, patient is alert and oriented x 3, fund of knowlege is appropriate by vocabulary.     CRANIAL NERVE EXAM:  There is no internuclear ophthalmoplegia.  Extraocular muscles are intact. Pupils are equal, round, and reactive to light. No facial asymmetry. Facial  sensation is intact bilaterally. There is no dysarthria. Uvula is midline, and palate moves symmetrically. Shoulder shrug intact bilaterlly. Tongue protrusion is midline. Hearing is intact to finger rub bilaterally. Neck is supple with full ROM    MOTOR EXAM: Normal bulk and tone throughout UE and LE bilaterally.   No pronator drift; rapid sequential movements are normal; Strength is  5/5 in all groups in the lower extremities and upper extremities    REFLEXES: 2+ and symmetric throughout in all four extremities; toes are up bilaterally    SENSORY EXAM: Normal to light touch and vibration,throughout.    COORDINATION: Normal finger-to-nose exam. Normal heel-to-shin exam.    GAIT: Narrow based and stable. Able to heel walk, toe walk, and perform tandem gait.     Negative Romberg's    Imaging:     Reviewed and discussed with pt and grandmother     Results for orders placed during the hospital encounter of 06/15/23    MRI Brain Demyelinating W W/O Contrast    Impression  Stable areas of T2/FLAIR signal within the periventricular white matter of the left frontal lobe.  These are not entirely specific but may relate to areas of prior demyelination in the appropriate clinical setting.  No new lesions.  No abnormal enhancement to suggest active demyelination.    No evidence of active or remote demyelination in the cervical spine.    Electronically signed by resident: Luis Fernando Torres  Date:    06/15/2023  Time:    16:42    Electronically signed by: Darryl Alvarado MD  Date:    06/16/2023  Time:    08:30    Results for orders placed during the hospital encounter of 06/15/23    MRI Cervical Spine Demyelinating W W/O Contrast    Impression  Stable areas of T2/FLAIR signal within the periventricular white matter of the left frontal lobe.  These are not entirely specific but may relate to areas of prior demyelination in the appropriate clinical setting.  No new lesions.  No abnormal enhancement to suggest active  "demyelination.    No evidence of active or remote demyelination in the cervical spine.    Electronically signed by resident: Luis Fernando Torres  Date:    06/15/2023  Time:    16:42    Electronically signed by: Darryl Alvarado MD  Date:    06/16/2023  Time:    08:30      Labs:     Lab Results   Component Value Date    [id number] 41 05/11/2023     No results found for: "JCVINDEX", "JCVANTIBODY"  No results found for: "LI1RXSGS", "ABSOLUTECD3", "SQ7BGCCN", "ABSOLUTECD8", "HO4GOIKK", "ABSOLUTECD4", "LABCD48"  Lab Results   Component Value Date    WBC 6.99 10/27/2021    RBC 4.01 10/27/2021    HGB 10.9 (L) 10/27/2021    HCT 33.5 (L) 10/27/2021    MCV 84 10/27/2021    MCH 27.2 10/27/2021    MCHC 32.5 10/27/2021    RDW 13.1 10/27/2021     10/27/2021    MPV 8.8 (L) 10/27/2021    GRAN 4.2 10/27/2021    GRAN 59.4 10/27/2021    LYMPH 2.2 10/27/2021    LYMPH 31.0 10/27/2021    MONO 0.6 10/27/2021    MONO 8.6 10/27/2021    EOS 0.0 10/27/2021    BASO 0.01 10/27/2021    EOSINOPHIL 0.6 10/27/2021    BASOPHIL 0.1 10/27/2021     Sodium   Date Value Ref Range Status   04/04/2021 136 136 - 145 mmol/L Final     Potassium   Date Value Ref Range Status   04/04/2021 3.9 3.5 - 5.1 mmol/L Final     Chloride   Date Value Ref Range Status   04/04/2021 107 95 - 110 mmol/L Final     CO2   Date Value Ref Range Status   04/04/2021 21 (L) 23 - 29 mmol/L Final     Glucose   Date Value Ref Range Status   04/04/2021 87 70 - 110 mg/dL Final     BUN   Date Value Ref Range Status   04/04/2021 6 6 - 20 mg/dL Final     Creatinine   Date Value Ref Range Status   04/04/2021 0.6 0.5 - 1.4 mg/dL Final     Calcium   Date Value Ref Range Status   04/04/2021 9.3 8.7 - 10.5 mg/dL Final     Total Protein   Date Value Ref Range Status   04/04/2021 7.2 6.0 - 8.4 g/dL Final     Albumin   Date Value Ref Range Status   04/04/2021 4.0 3.2 - 4.7 g/dL Final     Total Bilirubin   Date Value Ref Range Status   04/04/2021 0.5 0.1 - 1.0 mg/dL Final     Comment:     For " infants and newborns, interpretation of results should be based  on gestational age, weight and in agreement with clinical  observations.    Premature Infant recommended reference ranges:  Up to 24 hours.............<8.0 mg/dL  Up to 48 hours............<12.0 mg/dL  3-5 days..................<15.0 mg/dL  6-29 days.................<15.0 mg/dL       Alkaline Phosphatase   Date Value Ref Range Status   04/04/2021 62 48 - 95 U/L Final     AST   Date Value Ref Range Status   04/04/2021 18 10 - 40 U/L Final     ALT   Date Value Ref Range Status   04/04/2021 9 (L) 10 - 44 U/L Final     Anion Gap   Date Value Ref Range Status   04/04/2021 8 8 - 16 mmol/L Final     eGFR if    Date Value Ref Range Status   04/04/2021 >60 >60 mL/min/1.73 m^2 Final     eGFR if non    Date Value Ref Range Status   04/04/2021 >60 >60 mL/min/1.73 m^2 Final     Comment:     Calculation used to obtain the estimated glomerular filtration  rate (eGFR) is the CKD-EPI equation.        Lab Results   Component Value Date    HEPBSAG Negative 03/15/2021       RIS Impression and Plan:     Radiological Isolated Syndrome   -Assessment:pt is stable, will continue to monitor clinically for now  -Imaging:stable, repeat in 1 year (6/2024)  -Disease Modifying Therapies: NA  Symptom management   No change in management,  will add vitamin D3 1000IU and B12 1000mg daily to current regimen of multivitamin.  Would rather B12 injections, but pt was not wanting to do the injections.   Discussed healthy lifestyle and diet - continuing to exercise and following a Mediterranean diet     Return to clinic in 6 months with Dr. Johnson  .  Plan discussed and questions were answered to satisfaction.     Problem List Items Addressed This Visit          Other    Radiologically isolated syndrome - Primary     Other Visit Diagnoses       Anxiety                I spent a total of 50 minutes on the day of the visit.This includes face to face time and  non-face to face time preparing to see the patient (eg, review of tests), obtaining and/or reviewing separately obtained history, documenting clinical information in the electronic or other health record, independently interpreting results and communicating results to the patient/family/caregiver, or care coordinator.       Beulah Pyle, FNP-C

## 2023-08-13 ENCOUNTER — PATIENT MESSAGE (OUTPATIENT)
Dept: NEUROLOGY | Facility: CLINIC | Age: 23
End: 2023-08-13
Payer: MEDICAID

## 2023-08-13 DIAGNOSIS — E53.8 VITAMIN B12 DEFICIENCY: Primary | ICD-10-CM

## 2023-08-14 RX ORDER — CYANOCOBALAMIN 1000 UG/ML
INJECTION, SOLUTION INTRAMUSCULAR; SUBCUTANEOUS
Qty: 23 ML | Refills: 0 | Status: SHIPPED | OUTPATIENT
Start: 2023-08-14 | End: 2024-02-14

## 2023-08-16 ENCOUNTER — HOSPITAL ENCOUNTER (EMERGENCY)
Facility: OTHER | Age: 23
Discharge: HOME | End: 2023-08-16
Attending: EMERGENCY MEDICINE
Payer: MEDICAID

## 2023-08-16 VITALS
BODY MASS INDEX: 20.73 KG/M2 | WEIGHT: 117 LBS | SYSTOLIC BLOOD PRESSURE: 115 MMHG | DIASTOLIC BLOOD PRESSURE: 75 MMHG | HEIGHT: 63 IN | TEMPERATURE: 98 F | OXYGEN SATURATION: 98 % | HEART RATE: 95 BPM | RESPIRATION RATE: 16 BRPM

## 2023-08-16 DIAGNOSIS — R55 SYNCOPE: ICD-10-CM

## 2023-08-16 DIAGNOSIS — R07.89 CHEST WALL PAIN: Primary | ICD-10-CM

## 2023-08-16 LAB
ALBUMIN SERPL BCP-MCNC: 4.2 G/DL (ref 3.5–5.2)
ALP SERPL-CCNC: 62 U/L (ref 55–135)
ALT SERPL W/O P-5'-P-CCNC: 14 U/L (ref 10–44)
ANION GAP SERPL CALC-SCNC: 10 MMOL/L (ref 8–16)
AST SERPL-CCNC: 17 U/L (ref 10–40)
B-HCG UR QL: NEGATIVE
BASOPHILS # BLD AUTO: 0.02 K/UL (ref 0–0.2)
BASOPHILS NFR BLD: 0.3 % (ref 0–1.9)
BILIRUB SERPL-MCNC: 0.4 MG/DL (ref 0.1–1)
BILIRUB UR QL STRIP: NEGATIVE
BUN SERPL-MCNC: 8 MG/DL (ref 6–20)
CALCIUM SERPL-MCNC: 9.6 MG/DL (ref 8.7–10.5)
CHLORIDE SERPL-SCNC: 107 MMOL/L (ref 95–110)
CLARITY UR: CLEAR
CO2 SERPL-SCNC: 22 MMOL/L (ref 23–29)
COLOR UR: COLORLESS
CREAT SERPL-MCNC: 0.9 MG/DL (ref 0.5–1.4)
CTP QC/QA: YES
CTP QC/QA: YES
D DIMER PPP IA.FEU-MCNC: <0.19 MG/L FEU
DIFFERENTIAL METHOD: ABNORMAL
EOSINOPHIL # BLD AUTO: 0.1 K/UL (ref 0–0.5)
EOSINOPHIL NFR BLD: 0.9 % (ref 0–8)
ERYTHROCYTE [DISTWIDTH] IN BLOOD BY AUTOMATED COUNT: 11.9 % (ref 11.5–14.5)
EST. GFR  (NO RACE VARIABLE): >60 ML/MIN/1.73 M^2
GLUCOSE SERPL-MCNC: 115 MG/DL (ref 70–110)
GLUCOSE UR QL STRIP: ABNORMAL
HCT VFR BLD AUTO: 39.9 % (ref 37–48.5)
HGB BLD-MCNC: 13.3 G/DL (ref 12–16)
HGB UR QL STRIP: NEGATIVE
IMM GRANULOCYTES # BLD AUTO: 0.01 K/UL (ref 0–0.04)
IMM GRANULOCYTES NFR BLD AUTO: 0.2 % (ref 0–0.5)
KETONES UR QL STRIP: NEGATIVE
LEUKOCYTE ESTERASE UR QL STRIP: NEGATIVE
LYMPHOCYTES # BLD AUTO: 1.9 K/UL (ref 1–4.8)
LYMPHOCYTES NFR BLD: 33.5 % (ref 18–48)
MCH RBC QN AUTO: 30.6 PG (ref 27–31)
MCHC RBC AUTO-ENTMCNC: 33.3 G/DL (ref 32–36)
MCV RBC AUTO: 92 FL (ref 82–98)
MONOCYTES # BLD AUTO: 0.5 K/UL (ref 0.3–1)
MONOCYTES NFR BLD: 8.5 % (ref 4–15)
NEUTROPHILS # BLD AUTO: 3.3 K/UL (ref 1.8–7.7)
NEUTROPHILS NFR BLD: 56.6 % (ref 38–73)
NITRITE UR QL STRIP: NEGATIVE
NRBC BLD-RTO: 0 /100 WBC
PH UR STRIP: 6 [PH] (ref 5–8)
PLATELET # BLD AUTO: 294 K/UL (ref 150–450)
PMV BLD AUTO: 9 FL (ref 9.2–12.9)
POTASSIUM SERPL-SCNC: 3.7 MMOL/L (ref 3.5–5.1)
PROT SERPL-MCNC: 8.1 G/DL (ref 6–8.4)
PROT UR QL STRIP: NEGATIVE
RBC # BLD AUTO: 4.34 M/UL (ref 4–5.4)
SARS-COV-2 RDRP RESP QL NAA+PROBE: NEGATIVE
SODIUM SERPL-SCNC: 139 MMOL/L (ref 136–145)
SP GR UR STRIP: 1.01 (ref 1–1.03)
TROPONIN I SERPL DL<=0.01 NG/ML-MCNC: <0.006 NG/ML (ref 0–0.03)
URN SPEC COLLECT METH UR: ABNORMAL
UROBILINOGEN UR STRIP-ACNC: NEGATIVE EU/DL
WBC # BLD AUTO: 5.76 K/UL (ref 3.9–12.7)

## 2023-08-16 PROCEDURE — 87635 SARS-COV-2 COVID-19 AMP PRB: CPT

## 2023-08-16 PROCEDURE — 96360 HYDRATION IV INFUSION INIT: CPT

## 2023-08-16 PROCEDURE — 85379 FIBRIN DEGRADATION QUANT: CPT

## 2023-08-16 PROCEDURE — 25000003 PHARM REV CODE 250

## 2023-08-16 PROCEDURE — 81003 URINALYSIS AUTO W/O SCOPE: CPT

## 2023-08-16 PROCEDURE — 80053 COMPREHEN METABOLIC PANEL: CPT

## 2023-08-16 PROCEDURE — 81025 URINE PREGNANCY TEST: CPT | Performed by: PHYSICIAN ASSISTANT

## 2023-08-16 PROCEDURE — 99285 EMERGENCY DEPT VISIT HI MDM: CPT | Mod: 25

## 2023-08-16 PROCEDURE — 93005 ELECTROCARDIOGRAM TRACING: CPT

## 2023-08-16 PROCEDURE — 93010 EKG 12-LEAD: ICD-10-PCS | Mod: ,,, | Performed by: INTERNAL MEDICINE

## 2023-08-16 PROCEDURE — 93010 ELECTROCARDIOGRAM REPORT: CPT | Mod: ,,, | Performed by: INTERNAL MEDICINE

## 2023-08-16 PROCEDURE — 85025 COMPLETE CBC W/AUTO DIFF WBC: CPT

## 2023-08-16 PROCEDURE — 84484 ASSAY OF TROPONIN QUANT: CPT

## 2023-08-16 RX ORDER — ACETAMINOPHEN 325 MG/1
650 TABLET ORAL
Status: COMPLETED | OUTPATIENT
Start: 2023-08-16 | End: 2023-08-16

## 2023-08-16 RX ORDER — MAG HYDROX/ALUMINUM HYD/SIMETH 200-200-20
30 SUSPENSION, ORAL (FINAL DOSE FORM) ORAL ONCE
Status: DISCONTINUED | OUTPATIENT
Start: 2023-08-16 | End: 2023-08-17 | Stop reason: HOSPADM

## 2023-08-16 RX ORDER — FAMOTIDINE 20 MG/1
20 TABLET, FILM COATED ORAL 2 TIMES DAILY
Qty: 20 TABLET | Refills: 0 | Status: SHIPPED | OUTPATIENT
Start: 2023-08-16 | End: 2024-02-14

## 2023-08-16 RX ORDER — IBUPROFEN 600 MG/1
600 TABLET ORAL EVERY 6 HOURS PRN
Qty: 20 TABLET | Refills: 0 | Status: SHIPPED | OUTPATIENT
Start: 2023-08-16 | End: 2024-02-14

## 2023-08-16 RX ORDER — ACETAMINOPHEN 325 MG/1
TABLET ORAL
Status: DISPENSED
Start: 2023-08-16 | End: 2023-08-17

## 2023-08-16 RX ORDER — LIDOCAINE HYDROCHLORIDE 20 MG/ML
15 SOLUTION OROPHARYNGEAL ONCE
Status: DISCONTINUED | OUTPATIENT
Start: 2023-08-16 | End: 2023-08-17 | Stop reason: HOSPADM

## 2023-08-16 RX ORDER — MAG HYDROX/ALUMINUM HYD/SIMETH 200-200-20
SUSPENSION, ORAL (FINAL DOSE FORM) ORAL
Status: DISPENSED
Start: 2023-08-16 | End: 2023-08-17

## 2023-08-16 RX ADMIN — ACETAMINOPHEN 650 MG: 325 TABLET, FILM COATED ORAL at 08:08

## 2023-08-16 RX ADMIN — SODIUM CHLORIDE 1000 ML: 9 INJECTION, SOLUTION INTRAVENOUS at 08:08

## 2023-08-16 NOTE — FIRST PROVIDER EVALUATION
Emergency Department TeleTriage Encounter Note      CHIEF COMPLAINT    Chief Complaint   Patient presents with    Chest Pain     Pt presents to the ER with complaints of pain in the middle and left side of the chest after passing out and hitting her chest on a chair two nights ago. Pt states her chest is burning; worse with deep breath; no relief with Ibuprofen. Pt with a small abrasion between the breast due to fall.         VITAL SIGNS   Initial Vitals [08/16/23 1823]   BP Pulse Resp Temp SpO2   116/72 91 16 98 °F (36.7 °C) 99 %      MAP       --            ALLERGIES    Review of patient's allergies indicates:  No Known Allergies    PROVIDER TRIAGE NOTE  Patient had a syncopal episode 2 days and and hit her left chest. She is having pain to the left side of the chest. She does have a history of syncope in the past.       ORDERS  Labs Reviewed - No data to display    ED Orders (720h ago, onward)      None              Virtual Visit Note: The provider triage portion of this emergency department evaluation and documentation was performed via [employer], a HIPAA-compliant telemedicine application, in concert with a tele-presenter in the room. A face to face patient evaluation with one of my colleagues will occur once the patient is placed in an emergency department room.      DISCLAIMER: This note was prepared with [employer] voice recognition transcription software. Garbled syntax, mangled pronouns, and other bizarre constructions may be attributed to that software system.

## 2023-08-17 NOTE — ED PROVIDER NOTES
Source of History:  Patient    Chief complaint:  Chest Pain (Pt presents to the ER with complaints of pain in the middle and left side of the chest after passing out and hitting her chest on a chair two nights ago. Pt states her chest is burning; worse with deep breath; no relief with Ibuprofen. Pt with a small abrasion between the breast due to fall./)      HPI:  Soila Ridley is a 23 y.o. female presenting with left-sided chest pain x1 day after syncopal episode 2 days ago.  Patient reports that 2 days ago, she was at home with her boyfriend when she began to feel fatigued and lightheaded.  States that she stood up to go to bed and upon standing her vision went black and she passed out.  Reports that she landed on her boyfriend and the chair he was sitting in.  Boyfriend picked her up and moved her to the bed, she believes she was unconscious for approximately 5 minutes.  Believes she may have hit her head on chair during fall, but denies any headache since episode. Upon awakening she states she drank some water and ate some food and felt better.  Did notice some chest pressure upon awakening.  Reports that the next morning she felt better, but had a recurrence of lightheadedness with associated nausea in middle of the day.  Had 1 episode of vomiting. Did not have syncopal episode, returned home and took a nap. Began noticing worsening chest pressure and pain yesterday evening.  Reports the pain became a burning pain.  States it is made worse when she takes a deep breath or when she moves from a lying to a sitting position.  Does not believe it is related to food intake. Reports it is only located on the left side of her chest.  She states while pain is elicited sometimes when taking a deep breath, she does not feel like she has had significant shortness of breath, no dyspnea on exertion.  She reports pain improves with ibuprofen. She denies any current lightheadedness, dizziness, nausea, vomiting. Denies any  "headaches, visual changes, urinary symptoms, diarrhea. She reports similar episodes of syncope in the past. She reports she is on birth control and anxiety medication. She states her only new medication is a B12 injection she has been giving herself the last two nights because her B12 levels were found to be low.     This is the extent to the patients complaints today here in the emergency department.    PMH:  As per HPI and below:  Past Medical History:   Diagnosis Date    Anxiety disorder, unspecified     Bronchitis 01/01/2014     Past Surgical History:   Procedure Laterality Date    KNEE SURGERY         Social History     Tobacco Use    Smoking status: Never    Smokeless tobacco: Never   Substance Use Topics    Alcohol use: No    Drug use: No       Review of patient's allergies indicates:  No Known Allergies    ROS: As per HPI and below:  Constitutional: No fever.  No chills.  Eyes: No visual changes.  ENT: No sore throat. No ear pain.  Cardiovascular: Positive for chest pain.   Respiratory: No shortness of breath.   GI: No abdominal pain.  Positive for n/v yesterday.   Genitourinary: No abnormal urination.  Neurologic: No headache. No focal weakness.  No numbness. Positive for syncope.   MSK: no back pain.  Integument: No rashes or lesions.      Physical Exam:    /75   Pulse 95   Temp 98 °F (36.7 °C) (Oral)   Resp 16   Ht 5' 3" (1.6 m)   Wt 53.1 kg (117 lb)   LMP 07/29/2023   SpO2 98%   BMI 20.73 kg/m²   Vitals:    08/16/23 1823 08/16/23 2119 08/16/23 2145   BP: 116/72  115/75   Pulse: 91 95    Resp: 16     Temp: 98 °F (36.7 °C)     TempSrc: Oral     SpO2: 99% 98%    Weight: 53.1 kg (117 lb)     Height: 5' 3" (1.6 m)         Nursing note and vital signs reviewed.  Constitutional: No acute distress.  Well-appearing, non-toxic.  Eyes: No conjunctival injection.  Extraocular muscles are intact.  ENT: Oropharynx clear.   Cardiovascular:  Regular rate  and rhythm no murmurs gallops or rubs. 2+ radial " pulses bilaterally.   Chest/ Respiratory:  Clear to auscultation bilaterally without wheezing rhonchi or rales. Chest wall tenderness along the sternum, 2nd and 3rd left ribs anteriorly. No visible bruising, swelling. No crepitus.   Abdomen: Soft.  Not distended.  Nontender.  No guarding.  No rebound. Non-peritoneal.  Musculoskeletal: Tenderness along the biceps and triceps muscle of the left arm, no significant tenderness to the shoulder, elbow, lower arm. Full passive range of motion of left arm intact with no pain.. Good range of motion all other joints.  No deformities.  Neck supple.  No meningismus.  Skin: Small abrasion overlying sternum. No rashes seen.  Good turgor.   No ecchymoses.  Neuro: alert and oriented x3,  no focal neurological deficits. Cranial nerves intact. Sensation and motor intact x 4 extremities.  strength equal bilaterally.   Psych: Appropriate, conversant    Initial MDM:  23 year old female with past medical history of migraines and anxiety presenting for evaluation of chest pain x 2 days. Patient is hemodynamically stable and afebrile on arrival. Exam notable for tenderness to palpation along the sternum and 2nd-3rd anterior ribs. Neurological exam reassuring. CXR and left rib xray obtained by teletriage provider negative for any acute abnormality, no fracture noted. EKG with normal sinus rhythm, rate of 92. No ST elevations or depressions. No abnormal inverted T waves. When compared to prior, no significant changes noted. Given exam findings of tenderness to palpation with pain reproducible on exam, I have suspicion patient's chest pain may be secondary to rib contusion versus costochondritis. However, given that patient reports chest pressure that worsens with inspiration and is on birth control, along with recent syncopal episode, will obtain labs, cardiac workup, d dimer to rule out cardiac etiology of chest pain and workup for recent syncopal episode as well. Will give tylenol,  fluids for chest pain.    Labs that have been ordered have been independently reviewed and interpreted by myself.    Labs Reviewed   URINALYSIS, REFLEX TO URINE CULTURE - Abnormal; Notable for the following components:       Result Value    Color, UA Colorless (*)     Glucose, UA Trace (*)     All other components within normal limits    Narrative:     Specimen Source->Urine   CBC W/ AUTO DIFFERENTIAL - Abnormal; Notable for the following components:    MPV 9.0 (*)     All other components within normal limits   COMPREHENSIVE METABOLIC PANEL - Abnormal; Notable for the following components:    CO2 22 (*)     Glucose 115 (*)     All other components within normal limits   D DIMER, QUANTITATIVE   TROPONIN I   POCT URINE PREGNANCY   SARS-COV-2 RDRP GENE       X-Ray Chest PA And Lateral   Final Result      1. No acute cardiopulmonary process, no acute displaced left rib fracture.         Electronically signed by: Saji Ruiz MD   Date:    08/16/2023   Time:    19:32      X-Ray Ribs 2 View Left   Final Result      1. No acute cardiopulmonary process, no acute displaced left rib fracture.         Electronically signed by: Saji Ruiz MD   Date:    08/16/2023   Time:    19:32          Results for orders placed or performed during the hospital encounter of 08/16/23   EKG 12-lead    Collection Time: 08/16/23  6:31 PM    Narrative    Test Reason : R55,    Vent. Rate : 092 BPM     Atrial Rate : 092 BPM     P-R Int : 108 ms          QRS Dur : 078 ms      QT Int : 328 ms       P-R-T Axes : 026 080 051 degrees     QTc Int : 405 ms    Sinus rhythm with short UT  Otherwise normal ECG    Confirmed by Trena Krause MD ([id number]) on 8/17/2023 4:09:05 PM    Referred By: [email or web address]   SELF           Confirmed By:Trena Krause MD       Differential Diagnosis:  Differential Diagnosis includes, but is not limited to:  Arrhythmia, aortic dissection, MI/unstable angina, PE, cardiogenic shock, CHF, CVA/TIA, intracranial lesion/mass,  seizure, perforated viscous, ruptured AAA, orthostatic hypotension, vasovagal episode, anemia, dehydration, medication reaction, intentional overdose, rib contusion, costochondritis, GERD, pancreatitis      MDM  CBC within normal limits, no evidence of anemia, no leukocytosis. CMP unremarkable, no electrolyte abnormalities, no elevation in liver enzymes, normal renal function. Glucose 115. Troponin and D dimer negative. UA notable for trace glucose, no evidence of UTI. Covid negative. UPT negative. Workup overall reassuring. D dimer negative, no PE. EKG and troponin negative, patient young and healthy, low suspicion for ACS. Patient reported pain improved with tylenol. Suspect pain due to rib contusion from syncopal episode versus costochondritis. Recommend NSAIDs and tylenol, rest. Results explained to patient, all questions answered to patient and mother's satisfaction. Recommend follow up with PCP. I discussed this case with my attending, Dr. Moralez, who was in agreement with plan.    I see no indication of an emergent process beyond that addressed during our encounter but have duly counseled the patient/family regarding the need for prompt follow-up as well as the indications that should prompt immediate return to the emergency room should new or worrisome developments occur. I discussed the ED work up and diagnostic findings with the patient. The patient/family has been provided with verbal and printed direction regarding our final diagnosis(es) as well as instructions regarding use of OTC and/or Rx medications intended to manage the patient's aforementioned conditions. The patient/family verbalized an understanding. The patient/family is asked if there are any questions or concerns. We discuss the case, until all issues are addressed to the patient/family's satisfaction. Patient/family understands and is agreeable to the plan.       ED Course as of 08/17/23 1615   Wed Aug 16, 2023   1931 Preg Test, Ur:  Negative [SW]      ED Course User Index  [SW] Renata Bland PA-C       Diagnostic Impression:    1. Chest wall pain    2. Syncope         ED Disposition Condition    Discharge Stable            ED Prescriptions       Medication Sig Dispense Start Date End Date Auth. Provider    ibuprofen (ADVIL,MOTRIN) 600 MG tablet Take 1 tablet (600 mg total) by mouth every 6 (six) hours as needed for Pain. 20 tablet 8/16/2023 -- Renata Bland PA-C    famotidine (PEPCID) 20 MG tablet Take 1 tablet (20 mg total) by mouth 2 (two) times daily. 20 tablet 8/16/2023 8/15/2024 Renata Bland PA-C          Follow-up Information       Follow up With Specialties Details Why Contact Info    Elizabeth Jc, NP Family Medicine Schedule an appointment as soon as possible for a visit in 2 days  [address] [facility]  [address]  [place] [id number] [phone]      [facility] Emergency Medicine Go to  If symptoms worsen [address]  [facility] [id number] [phone]             Renata Bland PA-C  08/17/23 1663

## 2023-08-17 NOTE — DISCHARGE INSTRUCTIONS
You were seen in the ER for evaluation of chest pain.  Your workup was very reassuring.  I suspect that your pain is related to the fall you recently took when you passed out 2 days ago.  I would like you to take ibuprofen as needed for pain.  The burning sensation you describe may also be related to reflux.  I have prescribed you Pepcid that you may take as needed for reflux.  Please follow up with your primary care provider within 1 week.  Return to the ER for any new or worsening symptoms.    Thank you for coming to our Emergency Department today. It is important to remember that some problems or medical conditions are difficult to diagnose and may not be found or addressed during your Emergency Department visit.  These conditions often start with non-specific symptoms and can only be diagnosed on follow up visits with your primary care physician or specialist when the symptoms continue or change. Please remember that all medical conditions can change, and we cannot predict how you will be feeling tomorrow or the next day. Return to the ER with any questions/concerns, new/concerning symptoms, worsening or failure to improve.       Be sure to follow up with your primary care doctor and review all labs/imaging/tests that were performed during your ER visit with them. It is very common for us to identify non-emergent incidental findings which must be followed up with your primary care physician.  Some labs/imaging/tests may be outside of the normal range, and require non-emergent follow-up and/or further investigation/treatment/procedures/testing to help diagnose/exclude/prevent complications or other potentially serious medical conditions. Some abnormalities may not have been discussed or addressed during your ER visit. Some lab results may not return during your ER visit but can be accessible by downloading the free [facility] or by visiting [email or web address] . It is important for you to review all  labs/imaging/tests which are outside of the normal range with your physician.    An ER visit does not replace a primary care visit, and many screening tests or follow-up tests cannot be ordered by an ER doctor or performed by the ER. Some tests may even require pre-approval.    If you do not have a primary care doctor, you may contact the one listed on your discharge paperwork or you may also call the [facility] Appointment Desk at [phone] , or [address] [place] [address] at  [phone] to schedule an appointment, or establish care with a primary care doctor or even a specialist and to obtain information about local resources. It is important to your health that you have a primary care doctor.    Please take all medications as directed. We have done our best to select a medication for you that will treat your condition however, all medications may potentially have side-effects and it is impossible to predict which medications may give you side-effects or what those side-effects (if any) those medications may give you.  If you feel that you are having a negative effect or side-effect of any medication you should stop taking those medications immediately and seek medical attention. If you feel that you are having a life-threatening reaction call 911.        Do not drive, swim, climb to height, take a bath, operate heavy machinery, drink alcohol or take potentially sedating medications, sign any legal documents or make any important decisions for 24 hours if you have received any pain medications, sedatives or mood altering drugs during your ER visit or within 24 hours of taking them if they have been prescribed to you.     You can find additional resources for Dentists, hearing aids, durable medical equipment, low cost pharmacies and other resources at [email or web address]

## 2023-08-30 ENCOUNTER — TELEPHONE (OUTPATIENT)
Dept: OBSTETRICS AND GYNECOLOGY | Facility: CLINIC | Age: 23
End: 2023-08-30
Payer: MEDICAID

## 2023-10-07 DIAGNOSIS — Z30.011 ENCOUNTER FOR INITIAL PRESCRIPTION OF CONTRACEPTIVE PILLS: ICD-10-CM

## 2023-10-08 RX ORDER — NORETHINDRONE ACETATE AND ETHINYL ESTRADIOL, AND FERROUS FUMARATE 1.5-30(21)
KIT ORAL
Qty: 84 TABLET | Refills: 0 | Status: SHIPPED | OUTPATIENT
Start: 2023-10-08 | End: 2024-02-14

## 2023-10-08 NOTE — TELEPHONE ENCOUNTER
Refill Decision Note   Soila Ridley  is requesting a refill authorization.  Brief Assessment and Rationale for Refill:  Approve     Medication Therapy Plan:         Comments:     Note composed:1:58 AM 10/08/2023

## 2023-10-24 ENCOUNTER — PATIENT MESSAGE (OUTPATIENT)
Dept: OBSTETRICS AND GYNECOLOGY | Facility: CLINIC | Age: 23
End: 2023-10-24
Payer: MEDICAID

## 2023-10-24 NOTE — TELEPHONE ENCOUNTER
Spoke to patient regarding breakthrough bleeding on pills. States her cycle ended on this past Saturday and now having dark brown spotting. Denies heavy bleeding. Advised patient of bleeding precautions and to continue pills daily as prescribed. Patient requested sooner appointment. Advised that Dr. Hardin does not have any sooner than schedule appointment on 11/7 and can book with another provider. Patient desires to keep 11/7 appointment to discuss other birth control options.

## 2023-11-07 ENCOUNTER — OFFICE VISIT (OUTPATIENT)
Dept: OBSTETRICS AND GYNECOLOGY | Facility: CLINIC | Age: 23
End: 2023-11-07
Payer: MEDICAID

## 2023-11-07 VITALS — BODY MASS INDEX: 20.46 KG/M2 | WEIGHT: 115.5 LBS | HEIGHT: 63 IN

## 2023-11-07 DIAGNOSIS — Z01.419 ENCOUNTER FOR WELL WOMAN EXAM WITH ROUTINE GYNECOLOGICAL EXAM: Primary | ICD-10-CM

## 2023-11-07 DIAGNOSIS — Z30.013 ENCOUNTER FOR INITIAL PRESCRIPTION OF INJECTABLE CONTRACEPTIVE: ICD-10-CM

## 2023-11-07 LAB
B-HCG UR QL: NEGATIVE
CTP QC/QA: YES

## 2023-11-07 PROCEDURE — 99999 PR PBB SHADOW E&M-EST. PATIENT-LVL II: ICD-10-PCS | Mod: PBBFAC,,, | Performed by: OBSTETRICS & GYNECOLOGY

## 2023-11-07 PROCEDURE — 1159F PR MEDICATION LIST DOCUMENTED IN MEDICAL RECORD: ICD-10-PCS | Mod: CPTII,,, | Performed by: OBSTETRICS & GYNECOLOGY

## 2023-11-07 PROCEDURE — 81025 URINE PREGNANCY TEST: CPT | Mod: PBBFAC | Performed by: OBSTETRICS & GYNECOLOGY

## 2023-11-07 PROCEDURE — 88175 CYTOPATH C/V AUTO FLUID REDO: CPT | Performed by: PATHOLOGY

## 2023-11-07 PROCEDURE — 88141 PR  CYTOPATH CERV/VAG INTERPRET: ICD-10-PCS | Mod: ,,, | Performed by: PATHOLOGY

## 2023-11-07 PROCEDURE — 99395 PREV VISIT EST AGE 18-39: CPT | Mod: S$PBB,,, | Performed by: OBSTETRICS & GYNECOLOGY

## 2023-11-07 PROCEDURE — 99395 PR PREVENTIVE VISIT,EST,18-39: ICD-10-PCS | Mod: S$PBB,,, | Performed by: OBSTETRICS & GYNECOLOGY

## 2023-11-07 PROCEDURE — 1160F PR REVIEW ALL MEDS BY PRESCRIBER/CLIN PHARMACIST DOCUMENTED: ICD-10-PCS | Mod: CPTII,,, | Performed by: OBSTETRICS & GYNECOLOGY

## 2023-11-07 PROCEDURE — 99999PBSHW POCT URINE PREGNANCY: ICD-10-PCS | Mod: PBBFAC,,,

## 2023-11-07 PROCEDURE — 1159F MED LIST DOCD IN RCRD: CPT | Mod: CPTII,,, | Performed by: OBSTETRICS & GYNECOLOGY

## 2023-11-07 PROCEDURE — 88141 CYTOPATH C/V INTERPRET: CPT | Mod: ,,, | Performed by: PATHOLOGY

## 2023-11-07 PROCEDURE — 99999 PR PBB SHADOW E&M-EST. PATIENT-LVL II: CPT | Mod: PBBFAC,,, | Performed by: OBSTETRICS & GYNECOLOGY

## 2023-11-07 PROCEDURE — 99212 OFFICE O/P EST SF 10 MIN: CPT | Mod: PBBFAC | Performed by: OBSTETRICS & GYNECOLOGY

## 2023-11-07 PROCEDURE — 3008F PR BODY MASS INDEX (BMI) DOCUMENTED: ICD-10-PCS | Mod: CPTII,,, | Performed by: OBSTETRICS & GYNECOLOGY

## 2023-11-07 PROCEDURE — 99999PBSHW POCT URINE PREGNANCY: Mod: PBBFAC,,,

## 2023-11-07 PROCEDURE — 3008F BODY MASS INDEX DOCD: CPT | Mod: CPTII,,, | Performed by: OBSTETRICS & GYNECOLOGY

## 2023-11-07 PROCEDURE — 1160F RVW MEDS BY RX/DR IN RCRD: CPT | Mod: CPTII,,, | Performed by: OBSTETRICS & GYNECOLOGY

## 2023-11-07 RX ORDER — MEDROXYPROGESTERONE ACETATE 150 MG/ML
150 INJECTION, SUSPENSION INTRAMUSCULAR
Qty: 1 ML | Refills: 4 | Status: SHIPPED | OUTPATIENT
Start: 2023-11-07

## 2023-11-07 RX ORDER — RIMEGEPANT SULFATE 75 MG/75MG
TABLET, ORALLY DISINTEGRATING ORAL
COMMUNITY

## 2023-11-07 NOTE — PROGRESS NOTES
History & Physical  Gynecology      SUBJECTIVE:     Chief Complaint: Well Woman       History of Present Illness:    Soila Ridley is a 23 y.o. female  here for annual routine Pap and checkup. Patient's last menstrual period was 10/17/2023..  She has no unusual complaints.      Pt is on OCPs for contraception.  Pt reports that she is having heavy bleeding during her menses.  Pt reports that they have been heavier than they usually are.  Reports that she would bleed for about 5 days and then 2 more days of bleeding and cramping afterwards.  Pt is interested in discussing other options for contraception.  Interested in depo provera.    denies break through bleeding.   denies vaginal itching or irritation.  denies vaginal discharge.    She is sexually active with 1 partner  She uses oral contraceptives (estrogen/progesterone) for contraception.    History of abnormal pap: No  Last Pap: N/A  Last MMG: No  Last Colonoscopy:  No      Review of patient's allergies indicates:  No Known Allergies    Past Medical History:   Diagnosis Date    Anxiety disorder, unspecified     Bronchitis 2014     Past Surgical History:   Procedure Laterality Date    KNEE SURGERY       OB History          1    Para   1    Term   1       0    AB   0    Living   1         SAB   0    IAB   0    Ectopic   0    Multiple   0    Live Births   1               Family History   Problem Relation Age of Onset    Diabetes Mother     Migraines Mother     Migraines Father     Multiple sclerosis Neg Hx      Social History     Tobacco Use    Smoking status: Never    Smokeless tobacco: Never   Substance Use Topics    Alcohol use: No    Drug use: No       Current Outpatient Medications   Medication Sig    butalbital-acetaminophen-caffeine -40 mg (FIORICET, ESGIC) -40 mg per tablet Take 1 tablet by mouth 2 (two) times daily as needed.    cetirizine (ZYRTEC) 10 MG tablet Take 10 mg by mouth.    JUNEL FE 1.5/30, 28, 1.5 mg-30  mcg (21)/75 mg (7) tablet TAKE 1 TABLET BY MOUTH EVERY DAY    cyanocobalamin 1,000 mcg/mL injection Inject 1 mL (1,000 mcg total) into the skin once daily for 7 days, THEN 1 mL (1,000 mcg total) once a week for 30 days, THEN 1 mL (1,000 mcg total) every 30 days. (Patient not taking: Reported on 11/7/2023)    diazePAM (VALIUM) 5 MG tablet Take 1 tablet (5 mg total) by mouth as needed (Claustrophobia and anxiety in MRI).    dicyclomine (BENTYL) 10 MG capsule Take 10 mg by mouth 4 (four) times daily before meals and nightly.    famotidine (PEPCID) 20 MG tablet Take 1 tablet (20 mg total) by mouth 2 (two) times daily. (Patient not taking: Reported on 11/7/2023)    ibuprofen (ADVIL,MOTRIN) 600 MG tablet Take 1 tablet (600 mg total) by mouth every 6 (six) hours as needed for Pain.    medroxyPROGESTERone (DEPO-PROVERA) 150 mg/mL Syrg Inject 1 mL (150 mg total) into the muscle every 3 (three) months.    rimegepant (NURTEC) 75 mg odt Nurtec     No current facility-administered medications for this visit.         Review of Systems:  Review of Systems   Constitutional:  Negative for activity change, appetite change, chills, fatigue, fever and unexpected weight change.   Respiratory:  Negative for cough, shortness of breath and wheezing.    Cardiovascular:  Negative for chest pain and leg swelling.   Gastrointestinal:  Negative for abdominal pain, constipation, diarrhea, nausea and vomiting.   Endocrine: Negative for hair loss and hot flashes.   Genitourinary:  Negative for decreased libido, dyspareunia, dysuria, frequency, menstrual problem, pelvic pain, vaginal bleeding, vaginal discharge and vaginal pain.   Integumentary:  Negative for acne, hair changes, nipple discharge and breast skin changes.   Neurological:  Negative for headaches.   Psychiatric/Behavioral:  Negative for sleep disturbance.    Breast: Negative for mastodynia, nipple discharge and skin changes       OBJECTIVE:     Physical Exam:  Physical  Exam  Constitutional:       Appearance: She is well-developed.   HENT:      Head: Normocephalic and atraumatic.   Eyes:      General: No scleral icterus.        Right eye: No discharge.         Left eye: No discharge.      Conjunctiva/sclera: Conjunctivae normal.   Pulmonary:      Effort: Pulmonary effort is normal.      Breath sounds: No stridor.   Chest:      Chest wall: No mass or tenderness.   Breasts:     Breasts are symmetrical.      Right: No inverted nipple, mass, nipple discharge, skin change or tenderness.      Left: No inverted nipple, mass, nipple discharge, skin change or tenderness.   Abdominal:      General: There is no distension.      Palpations: Abdomen is soft.      Tenderness: There is no abdominal tenderness.   Genitourinary:     Labia:         Right: No rash, tenderness, lesion or injury.         Left: No rash, tenderness, lesion or injury.       Vagina: Normal.      Cervix: No cervical motion tenderness, discharge or friability.      Adnexa:         Right: No mass, tenderness or fullness.          Left: No mass, tenderness or fullness.        Comments: Normal external genitalia.  Normal hair distribution.  Urethral meatus normal. No cervical lesions or masses.  No vaginal bleeding noted.  No adnexal or uterine tenderness.  No palpable adnexal masses.  Musculoskeletal:         General: Normal range of motion.   Skin:     General: Skin is warm and dry.   Neurological:      Mental Status: She is alert and oriented to person, place, and time.   Psychiatric:         Behavior: Behavior normal.         Thought Content: Thought content normal.         Judgment: Judgment normal.           ASSESSMENT:       ICD-10-CM ICD-9-CM    1. Encounter for well woman exam with routine gynecological exam  Z01.419 V72.31 Liquid-Based Pap Smear, Screening      2. Encounter for initial prescription of injectable contraceptive  Z30.013 V25.02 medroxyPROGESTERone (DEPO-PROVERA) 150 mg/mL Syrg               Plan:       Soila was seen today for gynecologic exam.    Diagnoses and all orders for this visit:    Encounter for well woman exam with routine gynecological exam  - Pap smear today.  - MMG and Cscope not indicated at this time  -     Liquid-Based Pap Smear, Screening    Encounter for initial prescription of injectable contraceptive  -All forms of contraception discussed in length with patient.  Discussed the pros and cons of OCPs, depo provera, IUD, nexplanon, the patch and NuvaRing.  Discussed that pt might have irregular menses with the initiation of any form of contraception.  Discussed that none of these protect against STDs.  Depending on which contraception pt chooses, discussed need for backup form of birth control for at least the first month after switching.  Pt voiced understanding and all questions were answered.  After our discussion, the patient has decided to try depo provera  - Pt to reach out with any mood changes or other issues.  -     medroxyPROGESTERone (DEPO-PROVERA) 150 mg/mL Syrg; Inject 1 mL (150 mg total) into the muscle every 3 (three) months.        No orders of the defined types were placed in this encounter.      No follow-ups on file.    Counseling time: 15 minutes    Celina Hardin

## 2023-11-17 ENCOUNTER — PATIENT MESSAGE (OUTPATIENT)
Dept: OBSTETRICS AND GYNECOLOGY | Facility: CLINIC | Age: 23
End: 2023-11-17
Payer: MEDICAID

## 2023-11-17 LAB
FINAL PATHOLOGIC DIAGNOSIS: ABNORMAL
Lab: ABNORMAL

## 2023-11-17 NOTE — TELEPHONE ENCOUNTER
Spoke to patient regarding pap smear results. Advised patient per Dr. Champion that the cervical cells were abnormal and a colposcopy procedure is the recommended next step.

## 2023-12-07 ENCOUNTER — OFFICE VISIT (OUTPATIENT)
Dept: OBSTETRICS AND GYNECOLOGY | Facility: CLINIC | Age: 23
End: 2023-12-07
Payer: MEDICAID

## 2023-12-07 VITALS — WEIGHT: 116.88 LBS | HEIGHT: 63 IN | BODY MASS INDEX: 20.71 KG/M2

## 2023-12-07 DIAGNOSIS — R87.611 ATYPICAL SQUAMOUS CELLS CANNOT EXCLUDE HIGH GRADE SQUAMOUS INTRAEPITHELIAL LESION ON CYTOLOGIC SMEAR OF CERVIX (ASC-H): Primary | ICD-10-CM

## 2023-12-07 LAB
B-HCG UR QL: NEGATIVE
CTP QC/QA: YES

## 2023-12-07 PROCEDURE — 99999PBSHW POCT URINE PREGNANCY: ICD-10-PCS | Mod: PBBFAC,,,

## 2023-12-07 PROCEDURE — 88305 TISSUE EXAM BY PATHOLOGIST: CPT | Mod: 59 | Performed by: PATHOLOGY

## 2023-12-07 PROCEDURE — 81025 URINE PREGNANCY TEST: CPT | Mod: PBBFAC

## 2023-12-07 PROCEDURE — 99499 UNLISTED E&M SERVICE: CPT | Mod: S$PBB,,,

## 2023-12-07 PROCEDURE — 88342 IMHCHEM/IMCYTCHM 1ST ANTB: CPT | Mod: 59 | Performed by: PATHOLOGY

## 2023-12-07 PROCEDURE — 88342 IMHCHEM/IMCYTCHM 1ST ANTB: CPT | Mod: 26,,, | Performed by: PATHOLOGY

## 2023-12-07 PROCEDURE — 99213 OFFICE O/P EST LOW 20 MIN: CPT | Mod: PBBFAC

## 2023-12-07 PROCEDURE — 88342 CHG IMMUNOCYTOCHEMISTRY: ICD-10-PCS | Mod: 26,,, | Performed by: PATHOLOGY

## 2023-12-07 PROCEDURE — 99999 PR PBB SHADOW E&M-EST. PATIENT-LVL III: CPT | Mod: PBBFAC,,,

## 2023-12-07 PROCEDURE — 88305 TISSUE EXAM BY PATHOLOGIST: ICD-10-PCS | Mod: 26,,, | Performed by: PATHOLOGY

## 2023-12-07 PROCEDURE — 88305 TISSUE EXAM BY PATHOLOGIST: CPT | Mod: 26,,, | Performed by: PATHOLOGY

## 2023-12-07 PROCEDURE — 57454 COLPOSCOPY: ICD-10-PCS | Mod: S$PBB,,, | Performed by: OBSTETRICS & GYNECOLOGY

## 2023-12-07 PROCEDURE — 99499 NO LOS: ICD-10-PCS | Mod: S$PBB,,,

## 2023-12-07 PROCEDURE — 99999 PR PBB SHADOW E&M-EST. PATIENT-LVL III: ICD-10-PCS | Mod: PBBFAC,,,

## 2023-12-07 PROCEDURE — 99999PBSHW POCT URINE PREGNANCY: Mod: PBBFAC,,,

## 2023-12-07 PROCEDURE — 57454 BX/CURETT OF CERVIX W/SCOPE: CPT | Mod: PBBFAC | Performed by: OBSTETRICS & GYNECOLOGY

## 2023-12-07 NOTE — PROCEDURES
Colposcopy    Date/Time: 12/7/2023 1:00 PM    Performed by: Abigail Mcconnell MD  Authorized by: Abigail Mcconnell MD    Timeout:Immediately prior to procedure a time out was called to verify the correct patient, procedure, equipment, support staff and site/side marked as required  Prep:Patient was prepped and draped in the usual sterile fashion  Assistants?: Yes      Colposcopy Site:  Cervix  Position:  Supine  Acrowhite Lesion? Yes (11 o'clock and 1 o'clock)    Atypical Vessels? Yes (1 o'clock)    Transformation Zone Adequate?: Yes    Biopsy?: Yes         Location:  Cervix ((1 00 and 11 00))  ECC Performed?: Yes    LEEP Performed?: No     Patient tolerated the procedure well with no immediate complications.   Post-operative instructions were provided for the patient.   Patient was discharged and will follow up if any complications occur     Monsel's Paste used for hemostasis

## 2023-12-09 ENCOUNTER — PATIENT MESSAGE (OUTPATIENT)
Dept: OBSTETRICS AND GYNECOLOGY | Facility: CLINIC | Age: 23
End: 2023-12-09
Payer: MEDICAID

## 2023-12-10 NOTE — TELEPHONE ENCOUNTER
Called patient regarding pain control after colposcopy procedure on 12/7/23.     Patient reports that she has been taking tylenol and ibuprofen for pain control. She reports that these medications help with the pain, but then when her next dose is due her pain returns. She states that her pain is better today when compared to yesterday. She does not desire any additional pain medications at this time.     Instructed patient to call back with any further questions. ED precautions given    Patient is in agreement with the plan and all questions were answered.     Abigail Mcconnell MD  Obstetrics and Gynecology - PGY1

## 2023-12-13 ENCOUNTER — TELEPHONE (OUTPATIENT)
Dept: GYNECOLOGY | Facility: OTHER | Age: 23
End: 2023-12-13
Payer: MEDICAID

## 2023-12-13 ENCOUNTER — PATIENT MESSAGE (OUTPATIENT)
Dept: OBSTETRICS AND GYNECOLOGY | Facility: CLINIC | Age: 23
End: 2023-12-13
Payer: MEDICAID

## 2023-12-13 LAB
FINAL PATHOLOGIC DIAGNOSIS: NORMAL
GROSS: NORMAL
Lab: NORMAL

## 2023-12-13 NOTE — TELEPHONE ENCOUNTER
Called patient regarding recent Colposcopy results:    Results for orders placed or performed in visit on 12/07/23   Specimen to Pathology, Ob/Gyn   Result Value Ref Range    Final Pathologic Diagnosis       1. 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  Immunostain for p16 shows focal patchy positivity but no full-thickness epithelial staining.  The control   stains appropriately.  2. Cervical biopsy at 1 o'clock shows detached fragments of squamous epithelium with mild dysplasia and koilocytotic atypia (CIN1).  Immunostain for p16 shows patchy positivity but no full-thickness epithelial staining.  The control stains appropriately.  3. Endocervical curettage shows scant fragments of benign endocervical glandular epithelium and blood clot, no dysplasia identified in this limited sample.      Gross       Pathology ID:  [id number]  Patient ID:  [id number]  Received in 3 parts:     Part 1:   Pathology ID:  [id number]  Patient ID:  [id number]    The specimen is received in formalin labeled &quot;11 colpo&quot;.  The specimen consists of a tan-white fragment of soft tissue measuring 0.5 x 0.4 x 0.2 cm.  The specimen is submitted entirely in cassette [id number].    Part 2:   Pathology ID:  [id number]  Patient ID:  [id number]    The specimen is received in formalin labeled &quot;1&quot;.  The specimen consists of a tan-white fragment of soft tissue measuring 0.4 x 0.2 x 0.2 cm.  The specimen is submitted entirely in cassette [id number].    Part 3:   Pathology ID:  [id number]  Patient ID:  [id number]    The specimen is received in formalin labeled &quot;ECC&quot;.  The specimen consists of multiple tan-white fragments of soft tissue admixed with semitranslucent, tan, mucoid material measuring 0.1 x 0.1 x 0.1 cm.  The specimen is wrapped in a white tea   bag and submitted entirely in cassette [id number].  The specimen may not survive  processing.    Holly Giron, MS  Grossing Technologist      Disclaimer       Unless the case is a 'gross only' or additional testing only, the final diagnosis for each specimen is based on a microscopic examination of appropriate tissue sections.    Narrative    Number of Specimens:->3  ------------------------->-------------------------  Spec 1 Procedure:->Cervix biopsy  Spec 1 Location:->11:00  Spec 1 Description:->11 o'clock  ------------------------->-------------------------  Spec 2 Procedure:->Cervix biopsy  Spec 2 Location:->1:00  Spec 2 Description:->1 o'clock  ------------------------->-------------------------  Spec 3 Procedure:->Endocervix Curettage  Spec 3 Description:->ECC  Release to patient->Immediate     Per ASCCP guidelines - recommend repeat Pap Smear at 1 year, and again at 2 year.     Patient followed with Dr. Hardin, will send a message to her clinic regarding follow up.     Patient in agreement with the plan and all questions were answered.    Abigail Mcconnell MD  Obstetrics and Gynecology - PGY1

## 2023-12-14 ENCOUNTER — TELEPHONE (OUTPATIENT)
Dept: OBSTETRICS AND GYNECOLOGY | Facility: CLINIC | Age: 23
End: 2023-12-14
Payer: MEDICAID

## 2023-12-14 NOTE — TELEPHONE ENCOUNTER
----- Message from Karla King sent at 12/14/2023  8:55 AM CST -----  Regarding: Return Call    Who Called:  Patient      Who Left Message for Patient:  Massiel      Does the patient know what this is regarding?  Patient stated she can be reach my phone or leave a message in the portal         Best Call Back Number:  [phone]         Additional Information: Patient is returning a call.  Please assist.

## 2023-12-14 NOTE — TELEPHONE ENCOUNTER
Left message to patient to call the office at [phone]. Follow up pap in a year from colposcopy in December.

## 2023-12-14 NOTE — TELEPHONE ENCOUNTER
----- Message from Abigail Mcconnell MD sent at 12/13/2023  4:56 PM CST -----  Regarding: Appointment Request  Yemi Tim,     This patient had a colposcopy done in the resident GYN clinic after an ASC-H pap smear that showed CIN1. She needs a repeat pap in 1 year and again in 2 years. Dr. Hardin is her primary OBGYN and she would like to follow up with her. Could you help me schedule this?    Thank you,  Abigail Mcconnell

## 2023-12-20 NOTE — ADDENDUM NOTE
Addendum  created 04/20/23 1715 by Richelle Ivey MD    Clinical Note Signed, Intraprocedure Blocks edited, SmartForm saved      
No

## 2024-01-24 ENCOUNTER — PATIENT MESSAGE (OUTPATIENT)
Dept: OBSTETRICS AND GYNECOLOGY | Facility: CLINIC | Age: 24
End: 2024-01-24
Payer: MEDICAID

## 2024-01-29 ENCOUNTER — PATIENT MESSAGE (OUTPATIENT)
Dept: NEUROLOGY | Facility: CLINIC | Age: 24
End: 2024-01-29
Payer: MEDICAID

## 2024-02-14 ENCOUNTER — OFFICE VISIT (OUTPATIENT)
Dept: NEUROLOGY | Facility: CLINIC | Age: 24
End: 2024-02-14
Payer: MEDICAID

## 2024-02-14 VITALS
HEART RATE: 94 BPM | WEIGHT: 123.56 LBS | DIASTOLIC BLOOD PRESSURE: 66 MMHG | BODY MASS INDEX: 21.89 KG/M2 | HEIGHT: 63 IN | SYSTOLIC BLOOD PRESSURE: 105 MMHG

## 2024-02-14 DIAGNOSIS — R93.0 RADIOLOGICALLY ISOLATED SYNDROME: Primary | ICD-10-CM

## 2024-02-14 PROCEDURE — 3008F BODY MASS INDEX DOCD: CPT | Mod: CPTII,,, | Performed by: STUDENT IN AN ORGANIZED HEALTH CARE EDUCATION/TRAINING PROGRAM

## 2024-02-14 PROCEDURE — 99215 OFFICE O/P EST HI 40 MIN: CPT | Mod: S$PBB,,, | Performed by: STUDENT IN AN ORGANIZED HEALTH CARE EDUCATION/TRAINING PROGRAM

## 2024-02-14 PROCEDURE — 3078F DIAST BP <80 MM HG: CPT | Mod: CPTII,,, | Performed by: STUDENT IN AN ORGANIZED HEALTH CARE EDUCATION/TRAINING PROGRAM

## 2024-02-14 PROCEDURE — 3074F SYST BP LT 130 MM HG: CPT | Mod: CPTII,,, | Performed by: STUDENT IN AN ORGANIZED HEALTH CARE EDUCATION/TRAINING PROGRAM

## 2024-02-14 PROCEDURE — 99999 PR PBB SHADOW E&M-EST. PATIENT-LVL III: CPT | Mod: PBBFAC,,, | Performed by: STUDENT IN AN ORGANIZED HEALTH CARE EDUCATION/TRAINING PROGRAM

## 2024-02-14 PROCEDURE — G2211 COMPLEX E/M VISIT ADD ON: HCPCS | Mod: S$PBB,,, | Performed by: STUDENT IN AN ORGANIZED HEALTH CARE EDUCATION/TRAINING PROGRAM

## 2024-02-14 PROCEDURE — 99213 OFFICE O/P EST LOW 20 MIN: CPT | Mod: PBBFAC | Performed by: STUDENT IN AN ORGANIZED HEALTH CARE EDUCATION/TRAINING PROGRAM

## 2024-02-14 PROCEDURE — 1159F MED LIST DOCD IN RCRD: CPT | Mod: CPTII,,, | Performed by: STUDENT IN AN ORGANIZED HEALTH CARE EDUCATION/TRAINING PROGRAM

## 2024-02-14 RX ORDER — VIT C/E/ZN/COPPR/LUTEIN/ZEAXAN 250MG-90MG
2000 CAPSULE ORAL DAILY
Refills: 0
Start: 2024-02-14

## 2024-02-14 NOTE — PROGRESS NOTES
"[facility]  Follow Up Patient Visit      Disease Summary     Principle neurological diagnosis: RIS     Date of symptom onset: 12/2022  Date of diagnosis: 5/2023  Disease type at diagnosis: RIS  Disease type currently: RIS  Previous therapy: NA  Current therapy: NA  Vitamin D Dose: 1000IU daily   Last MRI Brain: 6/15/2023 - stable  Last MRI C-spine: 6/15/2023 - no lesions  Last MRI T-spine: NA  CSF 01/31/2023:  2 white, 0 red, protein 26, glucose 48, 2 unique oligoclonal bands, normal myelin basic protein, IgG index 0.37  Other relevant labs and tests: NMO and MOG - negative, B12 - 1072  Vit D:   Lab Results   Component Value Date    [id number] 41 05/11/2023       Interval history:     She presents with her daughter. She's on daily B12 oral supplement, also taking vitamin D 1000 IU daily.     She takes fioricet nowadays rarely (last took a month ago).     Denies new symptoms.     ROS:     SOCIAL HISTORY  Living arrangements - the patient lives with their family.  Social History     Socioeconomic History    Marital status: Single   Tobacco Use    Smoking status: Never    Smokeless tobacco: Never   Substance and Sexual Activity    Alcohol use: No    Drug use: No    Sexual activity: Yes     Birth control/protection: OCP   Social History Narrative    Lives with parents and 1 brother and 1 sister.    2 dogs.       Patient Employment       Status   Student - Full Time    Employer   [employer] (OTHER)    Address   ,                  Exam:     Vitals:    02/14/24 1410   BP: 105/66   Pulse: 94   Weight: 56.1 kg (123 lb 9.1 oz)   Height: 5' 3" (1.6 m)          In general, the patient is well nourished and appears to be in no acute distress.    MENTAL STATUS: language is fluent, normal verbal comprehension, short-term and remote memory is intact, attention is normal, patient is alert and oriented x 3, fund of knowlege is appropriate by vocabulary. Behavior and judgment appropriate.     CRANIAL " NERVE EXAM:  There is no intrernuclear ophthalmoplegia.  Extraocular muscles are intact. Pupils are equal, round, and reactive to light. No facial asymmetry. Facial sensation is intact bilaterally. There is no dysarthria. Uvula is midline, and palate moves symmetrically. Shoulder shrug intact bilaterlly. Tongue protrusion is midline. Hearing is intact to finger rub bilaterally. Neck is supple with full ROM    MOTOR EXAM: Normal bulk and tone throughout UE and LE bilaterally.   No pronator drift; rapid sequential movements are normal;     Strength:  R deltoid 5/5, L deltoid 5/5  R biceps 5/5, L biceps 5/5  R triceps 5/5, L triceps 5/5  R finger flexors 5/5, L finger flexors 5/5  R finger extensors 5/5, L finger extensors 5/5  R finger abductors 5/5, L finger abductors 5/5  R  hip flexors 5/5, L hip flexors 5/5  R  hip extensors 5/5, L hip extensors 5/5  R knee extensors 5/5, L knee extensors 5/5  R knee flexors 5/5, L knee flexors 5/5  R ankle dorsiflexors 5/5, L ankle dorsiflexors 5/5  R ankle plantarflexors 5/5, L ankle plantarflexors 5/5    SENSORY EXAM: Normal to light touch throughout.    COORDINATION: Normal finger-to-nose exam. Normal heel-to-shin exam.    GAIT: Narrow based and stable.           5/11/2023    12:01 AM 8/11/2023    12:01 AM 2/14/2024    12:01 AM   Timed 25 Foot Walk:   Did patient wear an AFO? No No No   Was assistive device used? No No No   Time for 25 Foot Walk (seconds) 3.7 3.78 4.23   Time for 25 Foot Walk (seconds) 3.89 3.78 4.3     Imaging (personally reviewed):       Results for orders placed during the hospital encounter of 06/15/23    MRI Brain Demyelinating W W/O Contrast    Impression  Stable areas of T2/FLAIR signal within the periventricular white matter of the left frontal lobe.  These are not entirely specific but may relate to areas of prior demyelination in the appropriate clinical setting.  No new lesions.  No abnormal enhancement to suggest active demyelination.    No  "evidence of active or remote demyelination in the cervical spine.    Electronically signed by resident: Luis Fernando Torres  Date:    06/15/2023  Time:    16:42    Electronically signed by: Darryl Alvarado MD  Date:    06/16/2023  Time:    08:30    Results for orders placed during the hospital encounter of 06/15/23    MRI Cervical Spine Demyelinating W W/O Contrast    Impression  Stable areas of T2/FLAIR signal within the periventricular white matter of the left frontal lobe.  These are not entirely specific but may relate to areas of prior demyelination in the appropriate clinical setting.  No new lesions.  No abnormal enhancement to suggest active demyelination.    No evidence of active or remote demyelination in the cervical spine.    Electronically signed by resident: Luis Fernando Torres  Date:    06/15/2023  Time:    16:42    Electronically signed by: Darryl Alvarado MD  Date:    06/16/2023  Time:    08:30    No results found for this or any previous visit.      Labs:     Lab Results   Component Value Date    [id number] 41 05/11/2023     No results found for: "JCVINDEX", "JCVANTIBODY"  No results found for: "AQ8PRMNT", "ABSOLUTECD3", "ZK8UBILD", "ABSOLUTECD8", "WN6ROQGD", "ABSOLUTECD4", "LABCD48"  Lab Results   Component Value Date    WBC 5.76 08/16/2023    RBC 4.34 08/16/2023    HGB 13.3 08/16/2023    HCT 39.9 08/16/2023    MCV 92 08/16/2023    MCH 30.6 08/16/2023    MCHC 33.3 08/16/2023    RDW 11.9 08/16/2023     08/16/2023    MPV 9.0 (L) 08/16/2023    GRAN 3.3 08/16/2023    GRAN 56.6 08/16/2023    LYMPH 1.9 08/16/2023    LYMPH 33.5 08/16/2023    MONO 0.5 08/16/2023    MONO 8.5 08/16/2023    EOS 0.1 08/16/2023    BASO 0.02 08/16/2023    EOSINOPHIL 0.9 08/16/2023    BASOPHIL 0.3 08/16/2023     Sodium   Date Value Ref Range Status   08/16/2023 139 136 - 145 mmol/L Final     Potassium   Date Value Ref Range Status   08/16/2023 3.7 3.5 - 5.1 mmol/L Final     Chloride   Date Value Ref Range Status   08/16/2023 " 107 95 - 110 mmol/L Final     CO2   Date Value Ref Range Status   08/16/2023 22 (L) 23 - 29 mmol/L Final     Glucose   Date Value Ref Range Status   08/16/2023 115 (H) 70 - 110 mg/dL Final     BUN   Date Value Ref Range Status   08/16/2023 8 6 - 20 mg/dL Final     Creatinine   Date Value Ref Range Status   08/16/2023 0.9 0.5 - 1.4 mg/dL Final     Calcium   Date Value Ref Range Status   08/16/2023 9.6 8.7 - 10.5 mg/dL Final     Total Protein   Date Value Ref Range Status   08/16/2023 8.1 6.0 - 8.4 g/dL Final     Albumin   Date Value Ref Range Status   08/16/2023 4.2 3.5 - 5.2 g/dL Final     Total Bilirubin   Date Value Ref Range Status   08/16/2023 0.4 0.1 - 1.0 mg/dL Final     Comment:     For infants and newborns, interpretation of results should be based  on gestational age, weight and in agreement with clinical  observations.    Premature Infant recommended reference ranges:  Up to 24 hours.............<8.0 mg/dL  Up to 48 hours............<12.0 mg/dL  3-5 days..................<15.0 mg/dL  6-29 days.................<15.0 mg/dL       Alkaline Phosphatase   Date Value Ref Range Status   08/16/2023 62 55 - 135 U/L Final     AST   Date Value Ref Range Status   08/16/2023 17 10 - 40 U/L Final     ALT   Date Value Ref Range Status   08/16/2023 14 10 - 44 U/L Final     Anion Gap   Date Value Ref Range Status   08/16/2023 10 8 - 16 mmol/L Final     eGFR if    Date Value Ref Range Status   04/04/2021 >60 >60 mL/min/1.73 m^2 Final     eGFR if non    Date Value Ref Range Status   04/04/2021 >60 >60 mL/min/1.73 m^2 Final     Comment:     Calculation used to obtain the estimated glomerular filtration  rate (eGFR) is the CKD-EPI equation.          Diagnosis/Assessment/Plan:     RIS  -Assessment: Exam normal without any focal findings, MRI c-spine without any lesions, okay to monitor without DMT at this time.  -Imaging: Updated imaging in June 2024  -Disease Modifying Therapies: NA  Symptom  management   -Daily vit D 2000 IU   -Daily 1000mcg B12   -Start daily multivitamin   -Okay to try magnesium at bedtime  Plan discussed and questions were answered to satisfaction.  Return to clinic in 1 year.      NEURO MULTIPLE SCLEROSIS IMPRESSION:   Number of relapses in the past year?:  0  Clinical Progression:  Clinically Stable  MRI Progression:  Stable  MS Classification:  Radiologically isolated syndrome  DMT:  No change in management  Symptom Management:  No change in symptom management  Supplements:  Vit D        Total time spent with the patient: 40 minutes, including face to face consultation, chart review and coordination of care, on the day of the visit. This includes face to face time and non-face to face time preparing to see the patient (eg, review of tests), obtaining and/or reviewing separately obtained history, documenting clinical information in the electronic or other health record, independently interpreting results and communicating results to the patient/family/caregiver, or care coordination.   I performed a neurobehavioral status examination that included a clinical assessment of thinking, reasoning, and judgment. Please see above HPI and ROS for full details.       Kathleen Johnson MD, MSc  Attending neurologist

## 2024-02-14 NOTE — PATIENT INSTRUCTIONS
MRI June 2024  Follow up in 1 year    Take vitamin D 2000 IU daily. Okay to take vitamin B12 1000mcg orally daily   Start daily multivitamin  Ok to take Magnesium (citrate, or oxide, or glycinate) 400mg daily at bedtime

## 2024-04-15 ENCOUNTER — PATIENT MESSAGE (OUTPATIENT)
Dept: NEUROLOGY | Facility: CLINIC | Age: 24
End: 2024-04-15
Payer: MEDICAID

## 2024-04-15 ENCOUNTER — OFFICE VISIT (OUTPATIENT)
Dept: URGENT CARE | Facility: CLINIC | Age: 24
End: 2024-04-15
Payer: MEDICAID

## 2024-04-15 VITALS
RESPIRATION RATE: 18 BRPM | BODY MASS INDEX: 21.79 KG/M2 | WEIGHT: 123 LBS | DIASTOLIC BLOOD PRESSURE: 67 MMHG | SYSTOLIC BLOOD PRESSURE: 103 MMHG | HEART RATE: 95 BPM | OXYGEN SATURATION: 97 % | TEMPERATURE: 99 F | HEIGHT: 63 IN

## 2024-04-15 DIAGNOSIS — R10.2 PELVIC PAIN: Primary | ICD-10-CM

## 2024-04-15 DIAGNOSIS — R10.9 ABDOMINAL PAIN, UNSPECIFIED ABDOMINAL LOCATION: ICD-10-CM

## 2024-04-15 LAB
B-HCG UR QL: NEGATIVE
BILIRUB UR QL STRIP: NEGATIVE
CTP QC/QA: YES
GLUCOSE UR QL STRIP: NEGATIVE
KETONES UR QL STRIP: NEGATIVE
LEUKOCYTE ESTERASE UR QL STRIP: NEGATIVE
PH, POC UA: 5.5
POC BLOOD, URINE: NEGATIVE
POC NITRATES, URINE: NEGATIVE
PROT UR QL STRIP: NEGATIVE
SP GR UR STRIP: 1.02 (ref 1–1.03)
UROBILINOGEN UR STRIP-ACNC: NORMAL (ref 0.1–1.1)

## 2024-04-15 PROCEDURE — 81025 URINE PREGNANCY TEST: CPT | Mod: S$GLB,,,

## 2024-04-15 PROCEDURE — 81003 URINALYSIS AUTO W/O SCOPE: CPT | Mod: QW,S$GLB,,

## 2024-04-15 PROCEDURE — 99213 OFFICE O/P EST LOW 20 MIN: CPT | Mod: S$GLB,,,

## 2024-04-15 NOTE — PROGRESS NOTES
"Subjective:      Patient ID: Soila Ridley is a 23 y.o. female.    Vitals:  height is 5' 3" (1.6 m) and weight is 55.8 kg (123 lb). Her temperature is 98.5 °F (36.9 °C). Her blood pressure is 103/67 and her pulse is 95. Her respiration is 18 and oxygen saturation is 97%.     Chief Complaint: Abdominal Pain    23-year-old female presents to clinic today with chief complaint of right lower abdominal pain and discomfort during intercourse.  She states symptoms started one day ago specifically during sexual intercourse.  However, she notes that pain is still happening currently. Pain is a constant sharp pain. She states she has had this type of discomfort in the past. She is not concerned for STD testing today. Last visit to OBGYN was 12/23. She is currently on Depo shot for birth control, she does not have a monthly menstrual cycle.  Denies any change in diet or medications. Denies any blood, melena, or mucus in stool. Last normal bowel movement was this morning and was normal. Denies any recent antibiotic use. Denies any urinary frequency, urgency, dysuria, hematuria, vaginal discharge, vaginal odor, vaginal pain, pelvic pain, flank pain, back pain.  Denies hx of recurrent UTIs or kidney stones. Denies numbness or tingling. Denies radiation of pain. Denies fever, chills, body aches, chest pain, shortness of breath, nausea, vomiting, diarrhea, urinary sx, or rashes.           Abdominal Pain  This is a new problem. The current episode started yesterday. The onset quality is gradual. The problem has been gradually worsening. Pertinent negatives include no arthralgias, constipation, diarrhea, dysuria, fever, frequency, headaches, hematochezia, hematuria, myalgias, nausea or vomiting. Associated symptoms comments: Light headed  . Treatments tried: Nsaids. There is no history of abdominal surgery.       Constitution: Negative for activity change, appetite change, chills, sweating, fatigue, fever, generalized weakness and " international travel in last 60 days.   HENT:  Negative for ear pain, ear discharge, tinnitus, hearing loss, congestion, nosebleeds, foreign body in nose, postnasal drip, sinus pain, sinus pressure, sore throat, trouble swallowing and voice change.    Neck: Negative for neck pain, neck stiffness and neck swelling.   Cardiovascular:  Negative for chest pain, leg swelling, palpitations and sob on exertion.   Eyes:  Negative for eye discharge, eye itching, eye pain, eye redness, photophobia, vision loss, double vision and blurred vision.   Respiratory:  Negative for chest tightness, cough, sputum production, shortness of breath, wheezing and asthma.    Gastrointestinal:  Positive for abdominal pain. Negative for abdominal trauma, abdominal bloating, history of abdominal surgery, nausea, vomiting, constipation, diarrhea, bright red blood in stool, dark colored stools, rectal bleeding, rectal pain, hemorrhoids, heartburn and bowel incontinence.   Endocrine: cold intolerance and heat intolerance.   Genitourinary:  Positive for painful intercourse and pelvic pain. Negative for dysuria, frequency, urgency, urine decreased, flank pain, bladder incontinence, bed wetting, hematuria, history of kidney stones, ovarian cysts, genital trauma, vaginal pain, vaginal discharge, vaginal bleeding, vaginal odor and genital sore.   Musculoskeletal:  Negative for pain, joint pain, joint swelling, back pain and muscle ache.   Skin:  Negative for rash, erythema, bruising and hives.   Allergic/Immunologic: Negative for environmental allergies, seasonal allergies, food allergies, eczema, asthma, hives, itching and sneezing.   Neurological:  Negative for dizziness, light-headedness, headaches, disorientation and altered mental status.   Psychiatric/Behavioral:  Negative for altered mental status, disorientation, confusion, agitation and nervous/anxious. The patient is not nervous/anxious.       Objective:     Physical Exam   Constitutional:  She is oriented to person, place, and time. She appears well-developed.  Non-toxic appearance. She does not appear ill. No distress. normal  HENT:   Head: Normocephalic and atraumatic.   Ears:   Right Ear: External ear normal.   Left Ear: External ear normal.   Nose: Nose normal.   Mouth/Throat: Mucous membranes are normal.   Eyes: Conjunctivae and lids are normal.   Neck: Trachea normal. Neck supple.   Cardiovascular: Normal rate, regular rhythm and normal heart sounds.   Pulmonary/Chest: Effort normal and breath sounds normal. No stridor. No respiratory distress. She has no decreased breath sounds. She has no wheezes. She has no rhonchi. She has no rales.   Abdominal: Normal appearance and bowel sounds are normal. She exhibits no distension and no mass. Soft. There is abdominal tenderness in the suprapubic area. There is no rebound, no guarding, no tenderness at McBurney's point, negative Champion's sign, no left CVA tenderness, negative Rovsing's sign and no right CVA tenderness. No hernia.     Musculoskeletal: Normal range of motion.         General: Normal range of motion.   Neurological: She is alert and oriented to person, place, and time. She has normal strength.   Skin: Skin is warm, dry, intact, not diaphoretic and not pale. No erythema   Psychiatric: Her speech is normal and behavior is normal. Judgment and thought content normal.   Nursing note and vitals reviewed.      Assessment:     1. Pelvic pain    2. Abdominal pain, unspecified abdominal location      Results for orders placed or performed in visit on 04/15/24   POCT Urinalysis, Dipstick, Automated, W/O Scope   Result Value Ref Range    POC Blood, Urine Negative Negative    POC Bilirubin, Urine Negative Negative    POC Urobilinogen, Urine Normal 0.1 - 1.1    POC Ketones, Urine Negative Negative    POC Protein, Urine Negative Negative    POC Nitrates, Urine Negative Negative    POC Glucose, Urine Negative Negative    pH, UA 5.5     POC Specific  Gravity, Urine 1.025 1.003 - 1.029    POC Leukocytes, Urine Negative Negative   POCT urine pregnancy   Result Value Ref Range    POC Preg Test, Ur Negative Negative     Acceptable Yes        Plan:       Pelvic pain  -     US Pelvis Complete Non OB; Future; Expected date: 04/15/2024  -     POCT urine pregnancy    Abdominal pain, unspecified abdominal location  -     POCT Urinalysis, Dipstick, Automated, W/O Scope  -     Cancel: POCT urine pregnancy      Patient was clinically stable before leaving clinic.  Ultrasound of pelvis ordered and patient is scheduled for tomorrow 04/16/2024 for 2:45 p.m..  Patient was instructed to drink four different 8 oz cups of water one hour prior to ultrasound.  Patient will be called back with results.  Explained to patient that she can take over-the-counter ibuprofen/Tylenol as needed for pain relief.  Recommended a topical heating pad.  Refrain from working out or sexual intercourse until after ultrasound is completed tomorrow.  Recommended close follow up with OBGYN. We had shared decision making for patient's treatment. We discussed side effects/alternatives/benefits/risk and patient would like to proceed with treatment plan. We also discussed other OTC treatment recommendations. Patient was counseled, explained with the test results meaning, expected course, and answered all of questions. Gave patient strict ER/urgent care precautions in case symptoms worsen or if any new concerns arise.

## 2024-04-16 ENCOUNTER — HOSPITAL ENCOUNTER (OUTPATIENT)
Dept: RADIOLOGY | Facility: HOSPITAL | Age: 24
Discharge: HOME | End: 2024-04-16
Payer: MEDICAID

## 2024-04-16 DIAGNOSIS — R10.2 PELVIC PAIN: ICD-10-CM

## 2024-04-16 PROCEDURE — 76830 TRANSVAGINAL US NON-OB: CPT | Mod: 26,,, | Performed by: RADIOLOGY

## 2024-04-16 PROCEDURE — 76856 US EXAM PELVIC COMPLETE: CPT | Mod: 26,,, | Performed by: RADIOLOGY

## 2024-04-16 PROCEDURE — 76856 US EXAM PELVIC COMPLETE: CPT | Mod: TC

## 2024-04-19 ENCOUNTER — TELEPHONE (OUTPATIENT)
Dept: OBSTETRICS AND GYNECOLOGY | Facility: CLINIC | Age: 24
End: 2024-04-19
Payer: MEDICAID

## 2024-04-19 NOTE — TELEPHONE ENCOUNTER
Spoke to patient and scheduled follow up appointment in June. Offered earlier appointment with NP and patient declined. Patient advised per Dr. Hardin that results for pelvic ultrasound was normal. States that pelvic pain has improved.

## 2024-06-10 ENCOUNTER — PATIENT MESSAGE (OUTPATIENT)
Dept: NEUROLOGY | Facility: CLINIC | Age: 24
End: 2024-06-10
Payer: MEDICAID

## 2024-06-10 DIAGNOSIS — R93.0 RADIOLOGICALLY ISOLATED SYNDROME: Primary | ICD-10-CM

## 2024-06-10 DIAGNOSIS — F40.240 CLAUSTROPHOBIA: ICD-10-CM

## 2024-06-10 RX ORDER — DIAZEPAM 5 MG/1
TABLET ORAL
Qty: 2 TABLET | Refills: 0 | Status: SHIPPED | OUTPATIENT
Start: 2024-06-10

## 2024-06-12 ENCOUNTER — HOSPITAL ENCOUNTER (OUTPATIENT)
Dept: RADIOLOGY | Facility: HOSPITAL | Age: 24
Discharge: HOME | End: 2024-06-12
Attending: STUDENT IN AN ORGANIZED HEALTH CARE EDUCATION/TRAINING PROGRAM
Payer: MEDICAID

## 2024-06-12 DIAGNOSIS — R93.0 RADIOLOGICALLY ISOLATED SYNDROME: ICD-10-CM

## 2024-06-12 PROCEDURE — 70551 MRI BRAIN STEM W/O DYE: CPT | Mod: 26,,, | Performed by: RADIOLOGY

## 2024-06-12 PROCEDURE — 70551 MRI BRAIN STEM W/O DYE: CPT | Mod: TC

## 2024-06-13 ENCOUNTER — OFFICE VISIT (OUTPATIENT)
Dept: OBSTETRICS AND GYNECOLOGY | Facility: CLINIC | Age: 24
End: 2024-06-13
Payer: MEDICAID

## 2024-06-13 VITALS
WEIGHT: 131.19 LBS | DIASTOLIC BLOOD PRESSURE: 74 MMHG | HEIGHT: 63 IN | SYSTOLIC BLOOD PRESSURE: 114 MMHG | BODY MASS INDEX: 23.25 KG/M2

## 2024-06-13 DIAGNOSIS — G89.29 CHRONIC RLQ PAIN: Primary | ICD-10-CM

## 2024-06-13 DIAGNOSIS — R10.31 CHRONIC RLQ PAIN: Primary | ICD-10-CM

## 2024-06-13 PROCEDURE — 3008F BODY MASS INDEX DOCD: CPT | Mod: CPTII,,, | Performed by: OBSTETRICS & GYNECOLOGY

## 2024-06-13 PROCEDURE — 99214 OFFICE O/P EST MOD 30 MIN: CPT | Mod: S$PBB,,, | Performed by: OBSTETRICS & GYNECOLOGY

## 2024-06-13 PROCEDURE — 99213 OFFICE O/P EST LOW 20 MIN: CPT | Mod: PBBFAC | Performed by: OBSTETRICS & GYNECOLOGY

## 2024-06-13 PROCEDURE — 99999 PR PBB SHADOW E&M-EST. PATIENT-LVL III: CPT | Mod: PBBFAC,,, | Performed by: OBSTETRICS & GYNECOLOGY

## 2024-06-13 PROCEDURE — 3078F DIAST BP <80 MM HG: CPT | Mod: CPTII,,, | Performed by: OBSTETRICS & GYNECOLOGY

## 2024-06-13 PROCEDURE — 3074F SYST BP LT 130 MM HG: CPT | Mod: CPTII,,, | Performed by: OBSTETRICS & GYNECOLOGY

## 2024-06-13 NOTE — PROGRESS NOTES
History & Physical  Gynecology      SUBJECTIVE:     Chief Complaint: Follow-up       History of Present Illness:  Pt is a 25 y/o here for follow up TVUS.  Pt reports that she started having right pelvic pain with intercourse during pregnancy.  Has been having increasing pain in the RLQ with intercourse.  Hurts during intercourse, more with deep penetration.  Will have pain for 2-3 days afterwards.  When she picks up things and moves a certain way, it will also cause the pain.  No vaginal discharge or irritation.  No new partners.  Will occasionally having cramping, as well.  Just on the right side.  Normal BMs. No pain with BMs.  On depo shot for contraception.  No menses with depo provera.  Pt reports negative STD screening at outside facility.  Seen at urgent care for this acute on chronic pain       Review of patient's allergies indicates:  No Known Allergies    Past Medical History:   Diagnosis Date    Anxiety disorder, unspecified     Bronchitis 2014     Past Surgical History:   Procedure Laterality Date    KNEE SURGERY       OB History          1    Para   1    Term   1       0    AB   0    Living   1         SAB   0    IAB   0    Ectopic   0    Multiple   0    Live Births   1               Family History   Problem Relation Name Age of Onset    Diabetes Mother      Migraines Mother      Migraines Father      Multiple sclerosis Neg Hx       Social History     Tobacco Use    Smoking status: Never    Smokeless tobacco: Never   Substance Use Topics    Alcohol use: No    Drug use: No       Current Outpatient Medications   Medication Sig    butalbital-acetaminophen-caffeine -40 mg (FIORICET, ESGIC) -40 mg per tablet Take 1 tablet by mouth 2 (two) times daily as needed.    cetirizine (ZYRTEC) 10 MG tablet Take 10 mg by mouth.    cholecalciferol, vitamin D3, (VITAMIN D3) 25 mcg (1,000 unit) capsule Take 2 capsules (2,000 Units total) by mouth once daily.    diazePAM (VALIUM) 5 MG tablet  Take 1 tablet (5mg) by mouth an hour before MRI can take another tablet (5mg) at time of MRI if needed    medroxyPROGESTERone (DEPO-PROVERA) 150 mg/mL Syrg Inject 1 mL (150 mg total) into the muscle every 3 (three) months.    rimegepant (NURTEC) 75 mg odt Nurtec    dicyclomine (BENTYL) 10 MG capsule Take 10 mg by mouth 4 (four) times daily before meals and nightly. (Patient not taking: Reported on 4/15/2024)     No current facility-administered medications for this visit.         Review of Systems:  Review of Systems   Constitutional:  Negative for activity change, appetite change, chills, fatigue, fever and unexpected weight change.   Respiratory:  Negative for cough, shortness of breath and wheezing.    Cardiovascular:  Negative for chest pain and leg swelling.   Gastrointestinal:  Negative for abdominal pain, constipation, diarrhea, nausea and vomiting.   Endocrine: Negative for hair loss and hot flashes.   Genitourinary:  Positive for pelvic pain (RLQ pain). Negative for decreased libido, dyspareunia, dysuria, frequency, menstrual problem, vaginal bleeding, vaginal discharge and vaginal pain.   Integumentary:  Negative for acne, hair changes, nipple discharge and breast skin changes.   Neurological:  Negative for headaches.   Psychiatric/Behavioral:  Negative for sleep disturbance.    Breast: Negative for mastodynia, nipple discharge and skin changes       OBJECTIVE:     Physical Exam:  Physical Exam  Constitutional:       Appearance: She is well-developed.   HENT:      Head: Normocephalic and atraumatic.   Eyes:      General: No scleral icterus.        Right eye: No discharge.         Left eye: No discharge.      Conjunctiva/sclera: Conjunctivae normal.   Pulmonary:      Effort: Pulmonary effort is normal.      Breath sounds: No stridor.   Abdominal:      General: There is no distension.      Palpations: Abdomen is soft.      Tenderness: There is no abdominal tenderness.   Genitourinary:     Comments: Normal  external genitalia.  Normal hair distribution.  Urethral meatus normal. No cervical lesions or masses.  No vaginal bleeding noted.  No adnexal or uterine tenderness.  No palpable adnexal masses.  TTP along right inguinal area.  No bulge noted.   Musculoskeletal:         General: Normal range of motion.   Skin:     General: Skin is warm and dry.   Neurological:      Mental Status: She is alert and oriented to person, place, and time.   Psychiatric:         Behavior: Behavior normal.         Thought Content: Thought content normal.         Judgment: Judgment normal.           ASSESSMENT:       ICD-10-CM ICD-9-CM    1. Chronic RLQ pain  R10.31 789.03 US Abdomen Limited    G89.29 338.29 CANCELED: US Abdomen Complete             Plan:      Soila was seen today for follow-up.    Diagnoses and all orders for this visit:    Chronic RLQ pain  - Discussed RLQ pain with patient.   - TVUS normal without any notable cause for RLQ pain.  Discussed with patient  - Discussed option of US to assess for other causes of pain such as hernia, appy, etc.   -     US Abdomen Complete; Future  -     US Abdomen Limited; Future        Orders Placed This Encounter   Procedures    US Abdomen Limited       No follow-ups on file.     Face to Face time with patient: 25 min    30 minutes of total time spent on the encounter, which includes face to face time and non-face to face time preparing to see the patient (eg, review of tests), Obtaining and/or reviewing separately obtained history, Documenting clinical information in the electronic or other health record, Independently interpreting results (not separately reported) and communicating results to the patient/family/caregiver, or Care coordination (not separately reported).      Celina Hardin

## 2024-06-17 ENCOUNTER — HOSPITAL ENCOUNTER (OUTPATIENT)
Dept: RADIOLOGY | Facility: OTHER | Age: 24
Discharge: HOME | End: 2024-06-17
Attending: OBSTETRICS & GYNECOLOGY
Payer: MEDICAID

## 2024-06-17 ENCOUNTER — TELEPHONE (OUTPATIENT)
Dept: OBSTETRICS AND GYNECOLOGY | Facility: CLINIC | Age: 24
End: 2024-06-17
Payer: MEDICAID

## 2024-06-17 DIAGNOSIS — R10.31 CHRONIC RLQ PAIN: ICD-10-CM

## 2024-06-17 DIAGNOSIS — G89.29 CHRONIC RIGHT LOWER QUADRANT PAIN: ICD-10-CM

## 2024-06-17 DIAGNOSIS — G89.29 CHRONIC LEFT LOWER QUADRANT PAIN: Primary | ICD-10-CM

## 2024-06-17 DIAGNOSIS — R10.31 CHRONIC RIGHT LOWER QUADRANT PAIN: ICD-10-CM

## 2024-06-17 DIAGNOSIS — G89.29 CHRONIC RLQ PAIN: ICD-10-CM

## 2024-06-17 DIAGNOSIS — R10.32 CHRONIC LEFT LOWER QUADRANT PAIN: Primary | ICD-10-CM

## 2024-06-17 PROCEDURE — 76705 ECHO EXAM OF ABDOMEN: CPT | Mod: 26,,, | Performed by: RADIOLOGY

## 2024-06-17 PROCEDURE — 76705 ECHO EXAM OF ABDOMEN: CPT | Mod: TC

## 2024-06-17 PROCEDURE — 76705 ECHO EXAM OF ABDOMEN: CPT | Mod: 26,76,, | Performed by: RADIOLOGY

## 2024-06-20 ENCOUNTER — TELEPHONE (OUTPATIENT)
Dept: OBSTETRICS AND GYNECOLOGY | Facility: CLINIC | Age: 24
End: 2024-06-20
Payer: MEDICAID

## 2024-06-20 NOTE — TELEPHONE ENCOUNTER
----- Message from Violetta Ruano sent at 6/20/2024 11:05 AM CDT -----  Name of Who is calling :  ROSS OCHOA [[id number]]      What is the request in detail:Pt is having blood coming out and she is on the shot. Please assist         Can the clinic reply by [facility]:no            What number to call back if not in [facility]: [phone]

## 2024-06-20 NOTE — TELEPHONE ENCOUNTER
Spoke to patient regarding spotting/ light bleeding that started today. Patient is currently on depo due for next injection on 7/9. Advised patient that spotting can happen a couple of weeks before the injection os due. Advised patient to monitor spotting over the weekend and follow up if the bleeding persist into the weekend. Patient asked about possibly getting depo injection earlier on next week. Also requesting referral to Dr. Blackburn at [facility] medicine from follow up message about u/s results.    Patient aware provider is out of the office until next week and okay with waiting for response.

## 2024-06-26 RX ORDER — ESTRADIOL 1 MG/1
1 TABLET ORAL DAILY
Qty: 14 TABLET | Refills: 0 | Status: SHIPPED | OUTPATIENT
Start: 2024-06-26 | End: 2025-06-26

## 2024-06-28 ENCOUNTER — PATIENT MESSAGE (OUTPATIENT)
Dept: NEUROLOGY | Facility: CLINIC | Age: 24
End: 2024-06-28
Payer: MEDICAID

## 2024-07-01 ENCOUNTER — PATIENT MESSAGE (OUTPATIENT)
Dept: OBSTETRICS AND GYNECOLOGY | Facility: CLINIC | Age: 24
End: 2024-07-01
Payer: MEDICAID

## 2024-07-15 ENCOUNTER — PATIENT MESSAGE (OUTPATIENT)
Dept: OBSTETRICS AND GYNECOLOGY | Facility: CLINIC | Age: 24
End: 2024-07-15
Payer: MEDICAID

## 2024-07-16 RX ORDER — ESTRADIOL 1 MG/1
1 TABLET ORAL DAILY
Qty: 7 TABLET | Refills: 0 | Status: SHIPPED | OUTPATIENT
Start: 2024-07-16 | End: 2025-07-16

## 2024-08-10 ENCOUNTER — HOSPITAL ENCOUNTER (EMERGENCY)
Facility: OTHER | Age: 24
Discharge: HOME | End: 2024-08-10
Attending: EMERGENCY MEDICINE
Payer: MEDICAID

## 2024-08-10 VITALS
BODY MASS INDEX: 23.92 KG/M2 | HEART RATE: 98 BPM | WEIGHT: 135 LBS | OXYGEN SATURATION: 98 % | DIASTOLIC BLOOD PRESSURE: 56 MMHG | HEIGHT: 63 IN | TEMPERATURE: 98 F | SYSTOLIC BLOOD PRESSURE: 120 MMHG | RESPIRATION RATE: 18 BRPM

## 2024-08-10 DIAGNOSIS — G43.009 MIGRAINE WITHOUT AURA AND WITHOUT STATUS MIGRAINOSUS, NOT INTRACTABLE: Primary | ICD-10-CM

## 2024-08-10 LAB
B-HCG UR QL: NEGATIVE
CTP QC/QA: YES
CTP QC/QA: YES
SARS-COV-2 RDRP RESP QL NAA+PROBE: NEGATIVE

## 2024-08-10 PROCEDURE — 96374 THER/PROPH/DIAG INJ IV PUSH: CPT

## 2024-08-10 PROCEDURE — 63600175 PHARM REV CODE 636 W HCPCS

## 2024-08-10 PROCEDURE — 81025 URINE PREGNANCY TEST: CPT | Performed by: PHYSICIAN ASSISTANT

## 2024-08-10 PROCEDURE — 96375 TX/PRO/DX INJ NEW DRUG ADDON: CPT

## 2024-08-10 PROCEDURE — 25000003 PHARM REV CODE 250

## 2024-08-10 PROCEDURE — 87635 SARS-COV-2 COVID-19 AMP PRB: CPT | Performed by: PHYSICIAN ASSISTANT

## 2024-08-10 PROCEDURE — 99284 EMERGENCY DEPT VISIT MOD MDM: CPT | Mod: 25

## 2024-08-10 RX ORDER — BUTALBITAL, ACETAMINOPHEN AND CAFFEINE 50; 325; 40 MG/1; MG/1; MG/1
1 TABLET ORAL
Status: DISCONTINUED | OUTPATIENT
Start: 2024-08-10 | End: 2024-08-10

## 2024-08-10 RX ORDER — MAGNESIUM SULFATE HEPTAHYDRATE 40 MG/ML
2 INJECTION, SOLUTION INTRAVENOUS ONCE
Status: COMPLETED | OUTPATIENT
Start: 2024-08-10 | End: 2024-08-10

## 2024-08-10 RX ORDER — METOCLOPRAMIDE HYDROCHLORIDE 5 MG/ML
10 INJECTION INTRAMUSCULAR; INTRAVENOUS
Status: COMPLETED | OUTPATIENT
Start: 2024-08-10 | End: 2024-08-10

## 2024-08-10 RX ORDER — KETOROLAC TROMETHAMINE 30 MG/ML
30 INJECTION, SOLUTION INTRAMUSCULAR; INTRAVENOUS
Status: COMPLETED | OUTPATIENT
Start: 2024-08-10 | End: 2024-08-10

## 2024-08-10 RX ORDER — DIPHENHYDRAMINE HYDROCHLORIDE 50 MG/ML
12.5 INJECTION INTRAMUSCULAR; INTRAVENOUS
Status: COMPLETED | OUTPATIENT
Start: 2024-08-10 | End: 2024-08-10

## 2024-08-10 RX ADMIN — DIPHENHYDRAMINE HYDROCHLORIDE 12.5 MG: 50 INJECTION, SOLUTION INTRAMUSCULAR; INTRAVENOUS at 08:08

## 2024-08-10 RX ADMIN — SODIUM CHLORIDE 1000 ML: 9 INJECTION, SOLUTION INTRAVENOUS at 08:08

## 2024-08-10 RX ADMIN — METOCLOPRAMIDE 10 MG: 5 INJECTION, SOLUTION INTRAMUSCULAR; INTRAVENOUS at 08:08

## 2024-08-10 RX ADMIN — KETOROLAC TROMETHAMINE 30 MG: 30 INJECTION, SOLUTION INTRAMUSCULAR; INTRAVENOUS at 08:08

## 2024-08-10 RX ADMIN — MAGNESIUM SULFATE HEPTAHYDRATE 2 G: 40 INJECTION, SOLUTION INTRAVENOUS at 08:08

## 2024-08-11 NOTE — ED PROVIDER NOTES
"HISTORY OF PRESENT ILLNESS: Soila Ridley is a 24 y.o. female  who presents to the emergency department today with headache since this morning. There is associated photophobia and nausea. She describes the pain as throbbing and does not radiate. She took her home migraine medications which she takes daily without any improvement. No neck stiffness. No rash. This is not the worse headache of their life nor was it sudden onset. No aura.  Has a history of migraines.    Headache history:    New-onset   no  Neurological findings   no  Sudden onset or worst and onset   no  Fever immunocompromise no  Elderly   no  Chronic and progressive   no  Jaw claudication, muscle aches, temporal artery tenderness   no  Symptoms present in others at home   no  Chronic headache medication withdrawal   no  Pregnancy or recent postpartum   no  Clotting disorder  no  Trauma   no  Ocular pain   no  Recent cervical manipulation with facial pain   no  Dizziness   no  Neck pain   no  Neck stiffness   no  Rash   no      ROS  Constitutional: No fever, no chills.  Eyes: No discharge. No pain.  HENT: No nasal drainage. No ear ache. No sore throat.  Cardiovascular: No chest pain, no palpitations.  Respiratory: No cough, no shortness of breath.  Gastrointestinal: No abdominal pain, no vomiting. No diarrhea.  Genitourinary: No hematuria, dysuria, urgency.  Musculoskeletal: No back pain.   Skin: No rashes, no lesions.  Neurological: No  focal weakness.      Otherwise remaining ROS negative     The history is provided by the patient      ALLERGIES REVIEWED  MEDICATIONS REVIEWED  PMH/PSH/SOC/FH REVIEWED     Nursing/Ancillary staff note reviewed.  VS reviewed      Physical Exam  BP (!) 120/56   Pulse 98   Temp 98.3 °F (36.8 °C) (Oral)   Resp 18   Ht 5' 3" (1.6 m)   Wt 61.2 kg (135 lb)   SpO2 98%   Breastfeeding No   BMI 23.91 kg/m²   Nurse's notes reviewed  General Appearance:  This is a well-developed individual who is in no acute distress.  " Lying in bed but able to sit up on their own without any difficulty.  HEENT:  Head:  Normocephalic, atraumatic.      Eyes: Pupils equal and round no pallor or injection. Extra ocular movements intact. No nystagmus.      Mouth: Mucous membranes are moist. Oropharynx clear.      Ears:  Tympanic membranes normal bilaterally.  Neck: Neck is supple. No lymphadenopathy.  No stridor.   Respiratory: No respiratory distress. No tachypnea.  Cardiovascular: Regular rate and rhythm.   Gastrointestinal:  Abdomen is soft and non-tender, no masses, no pulsatile masses, bowel sounds normal.  Neurological: Alert and oriented x 4. CN II-XII grossly intact. No focal weakness. Strength intact 5/5 bilaterally in upper and lower extremities.   Skin: Warm and dry, no rashes.  Musculoskeletal:  No obvious deformities.  Extremities are non-tender, non-swollen and have full range of motion.  Back nontender to palpation down the midline.    Medical Decision Making:     Initial management:  Urgent evaluation of a 24 y.o. female who presents to the ED today for headache. The pt has no red flags on history or exam. Patient has no neck stiffness/meningismus, fever, elevated blood pressure, confusion, vision loss, temporal artery tenderness, rash, or thunderclap onset to suggest pseudotumor cerebri, meningitis, tumor, ICH, temporal arteritis, or encephalitis. Has a history of headaches with similar symptoms. Plan to treat pain and reassess. Will consider imaging if pain is intractable.     DIFFERENTIAL DIAGNOSIS: After history and physical exam a differential diagnosis was considered, but was not limited to, subarachnoid hemorrhage, migraine headache, tension headache, vascular malformation, aneurysm, temporal arteritis, mass lesion, and infectious causes such as meningitis, pharyngitis and sinusitis.    No orders to display       Labs Reviewed   SARS-COV-2 RDRP GENE       Result Value    POC Rapid COVID Negative       Acceptable  Yes     POCT URINE PREGNANCY    POC Preg Test, Ur Negative       Acceptable Yes              Medications   ketorolac injection 30 mg (30 mg Intravenous Given 8/10/24 2048)   metoclopramide injection 10 mg (10 mg Intravenous Given 8/10/24 2048)   diphenhydrAMINE injection 12.5 mg (12.5 mg Intravenous Given 8/10/24 2045)   sodium chloride 0.9% bolus 1,000 mL 1,000 mL (1,000 mLs Intravenous New Bag 8/10/24 2044)   magnesium sulfate 2g in water 50mL IVPB (premix) (2 g Intravenous New Bag 8/10/24 2057)       ED Management:  Patient remains afebrile nontoxic-appearing.  There continues to be no focal neurological deficits.  Upon re-evaluation, patient reports significant improvements. The patient is feeling improved and is comfortable going home at this time.  Will discharge home with symptom control and have him follow up with their primary care physician for further evaluation is necessary. The patient understands and is comfortable going home at this time.  Patient given return precautions and educated on worrisome symptoms for which they should return to the ER, including fever, neck pain, worsening headache, weakness, vision changes, nonstop vomiting. They reported understanding and all questions were answered.      IMPRESSION:    The encounter diagnosis was Migraine without aura and without status migrainosus, not intractable.      Discharge Medication List as of 8/10/2024 10:06 PM                 Katey Ayala PA-C  08/10/24 3291

## 2024-08-11 NOTE — ED TRIAGE NOTES
Pt presents to the ER with complaints of a migraine headache for the past two days. Pt states headache is worse with standing . Pt with a hx of migraines; no relief with prescribed home medications.

## 2024-08-11 NOTE — ED NOTES
LOC: The patient is awake, alert, and oriented to self, place, time, and situation. Pt is calm and cooperative. Affect is appropriate.  Speech is appropriate and clear.     APPEARANCE: Patient resting  on stretcher; appears uncomfortable but in no acute distress.  Patient is clean and well groomed.    SKIN: The skin is warm and dry; color consistent with ethnicity.  Patient has normal skin turgor and moist mucus membranes.  Skin intact; no breakdown or bruising noted.     MUSCULOSKELETAL: Patient moving upper and lower extremities without difficulty; denies pain in the extremities or back.  Denies weakness.     RESPIRATORY: Airway is open and patent. Respirations spontaneous, even, easy, and non-labored.  Patient has a normal effort and rate.  No accessory muscle use noted. Denies cough.     CARDIAC:  Normal rate noted.  No peripheral edema noted. No complaints of chest pain.     ABDOMEN: Soft and non tender to palpation.  No distention noted. Pt denies abdominal pain; denies nausea, vomiting, diarrhea, or constipation.    NEUROLOGIC: Eyes open spontaneously.  Behavior appropriate to situation.  Follows commands; facial expression symmetrical.  Purposeful motor response noted; normal sensation in all extremities. Pt reporting a headache that is worse with standing; denies lightheadedness or dizziness; denies visual disturbances; denies loss of balance; denies unilateral weakness.

## 2024-08-11 NOTE — FIRST PROVIDER EVALUATION
Emergency Department TeleTriage Encounter Note      CHIEF COMPLAINT    Chief Complaint   Patient presents with    Headache     Reports headache for two days, worse while standing. Hx of migraines. Took amitriptyline with no relief       VITAL SIGNS   Initial Vitals [08/10/24 1907]   BP Pulse Resp Temp SpO2   (!) 120/56 98 18 98.3 °F (36.8 °C) 98 %      MAP       --            ALLERGIES    Review of patient's allergies indicates:  No Known Allergies    PROVIDER TRIAGE NOTE  Patient presents with headache for 2 days worse with standing. She states it feels like post-lumbar puncture headache. She had LP last year to rule out MS. No other symptoms.       ORDERS  Labs Reviewed - No data to display    ED Orders (720h ago, onward)      None              Virtual Visit Note: The provider triage portion of this emergency department evaluation and documentation was performed via [employer], a HIPAA-compliant telemedicine application, in concert with a tele-presenter in the room. A face to face patient evaluation with one of my colleagues will occur once the patient is placed in an emergency department room.      DISCLAIMER: This note was prepared with [employer] voice recognition transcription software. Garbled syntax, mangled pronouns, and other bizarre constructions may be attributed to that software system.

## 2024-08-20 ENCOUNTER — PATIENT MESSAGE (OUTPATIENT)
Dept: OBSTETRICS AND GYNECOLOGY | Facility: CLINIC | Age: 24
End: 2024-08-20
Payer: MEDICAID

## 2024-09-25 ENCOUNTER — CLINICAL SUPPORT (OUTPATIENT)
Dept: OBSTETRICS AND GYNECOLOGY | Facility: CLINIC | Age: 24
End: 2024-09-25
Payer: MEDICAID

## 2024-09-25 DIAGNOSIS — Z30.42 ENCOUNTER FOR MANAGEMENT AND INJECTION OF DEPO-PROVERA: Primary | ICD-10-CM

## 2024-09-25 LAB
B-HCG UR QL: NEGATIVE
CTP QC/QA: YES

## 2024-09-25 PROCEDURE — 99999PBSHW POCT URINE PREGNANCY: Mod: PBBFAC,,,

## 2024-09-25 PROCEDURE — 81025 URINE PREGNANCY TEST: CPT | Mod: PBBFAC

## 2024-09-25 NOTE — PROGRESS NOTES
Patient needed a urine pregnancy test for depo shot due to coming out of timeframe. Pregnancy test performed with negative results.

## 2024-11-13 ENCOUNTER — OFFICE VISIT (OUTPATIENT)
Dept: OBSTETRICS AND GYNECOLOGY | Facility: CLINIC | Age: 24
End: 2024-11-13
Payer: MEDICAID

## 2024-11-13 VITALS
DIASTOLIC BLOOD PRESSURE: 64 MMHG | BODY MASS INDEX: 24.95 KG/M2 | SYSTOLIC BLOOD PRESSURE: 102 MMHG | WEIGHT: 140.88 LBS

## 2024-11-13 DIAGNOSIS — Z30.014 ENCOUNTER FOR INITIAL PRESCRIPTION OF INTRAUTERINE CONTRACEPTIVE DEVICE (IUD): ICD-10-CM

## 2024-11-13 DIAGNOSIS — Z01.419 ENCOUNTER FOR WELL WOMAN EXAM WITH ROUTINE GYNECOLOGICAL EXAM: Primary | ICD-10-CM

## 2024-11-13 PROCEDURE — 3008F BODY MASS INDEX DOCD: CPT | Mod: CPTII,,, | Performed by: OBSTETRICS & GYNECOLOGY

## 2024-11-13 PROCEDURE — 87624 HPV HI-RISK TYP POOLED RSLT: CPT | Performed by: OBSTETRICS & GYNECOLOGY

## 2024-11-13 PROCEDURE — 1159F MED LIST DOCD IN RCRD: CPT | Mod: CPTII,,, | Performed by: OBSTETRICS & GYNECOLOGY

## 2024-11-13 PROCEDURE — 3078F DIAST BP <80 MM HG: CPT | Mod: CPTII,,, | Performed by: OBSTETRICS & GYNECOLOGY

## 2024-11-13 PROCEDURE — 99999 PR PBB SHADOW E&M-EST. PATIENT-LVL III: CPT | Mod: PBBFAC,,, | Performed by: OBSTETRICS & GYNECOLOGY

## 2024-11-13 PROCEDURE — 99395 PREV VISIT EST AGE 18-39: CPT | Mod: S$PBB,,, | Performed by: OBSTETRICS & GYNECOLOGY

## 2024-11-13 PROCEDURE — 99213 OFFICE O/P EST LOW 20 MIN: CPT | Mod: PBBFAC | Performed by: OBSTETRICS & GYNECOLOGY

## 2024-11-13 PROCEDURE — 3074F SYST BP LT 130 MM HG: CPT | Mod: CPTII,,, | Performed by: OBSTETRICS & GYNECOLOGY

## 2024-11-13 RX ORDER — AMITRIPTYLINE HYDROCHLORIDE 25 MG/1
1 TABLET, FILM COATED ORAL NIGHTLY
COMMUNITY

## 2024-11-13 RX ORDER — ONDANSETRON 4 MG/1
4 TABLET, FILM COATED ORAL EVERY 6 HOURS PRN
COMMUNITY
Start: 2024-10-24

## 2024-11-13 NOTE — PROGRESS NOTES
History & Physical  Gynecology      SUBJECTIVE:     Chief Complaint: Well Woman       History of Present Illness:    Soila Ridley is a 24 y.o. female  here for annual routine Pap and checkup. Patient's last menstrual period was 2024 (approximate)..  She has no unusual complaints.      On depo provera.  Does well most of the time but has bleeding for the 2 weeks before her next shot is due.  Would like to switch to something else.  Has always had heavy menses so would like something that is not going to make her periods heavy. .    denies break through bleeding.   denies vaginal itching or irritation.  denies vaginal discharge.    She is sexually active with 1 partner  She uses depo provera for contraception.    History of abnormal pap: No  Last Pap: 2023, ASCUS HPV positive  Last MMG: No  Last Colonoscopy:  No      Review of patient's allergies indicates:  No Known Allergies    Past Medical History:   Diagnosis Date    Anxiety disorder, unspecified     Bronchitis 2014     Past Surgical History:   Procedure Laterality Date    KNEE SURGERY       OB History          1    Para   1    Term   1       0    AB   0    Living   1         SAB   0    IAB   0    Ectopic   0    Multiple   0    Live Births   1               Family History   Problem Relation Name Age of Onset    Migraines Father      Diabetes Mother      Migraines Mother      Multiple sclerosis Neg Hx      Breast cancer Neg Hx      Colon cancer Neg Hx      Ovarian cancer Neg Hx       Social History     Tobacco Use    Smoking status: Never    Smokeless tobacco: Never   Substance Use Topics    Alcohol use: No    Drug use: No       Current Outpatient Medications   Medication Sig    butalbital-acetaminophen-caffeine -40 mg (FIORICET, ESGIC) -40 mg per tablet Take 1 tablet by mouth 2 (two) times daily as needed.    cetirizine (ZYRTEC) 10 MG tablet Take 10 mg by mouth.    medroxyPROGESTERone (DEPO-PROVERA) 150 mg/mL Syrg  Inject 1 mL (150 mg total) into the muscle every 3 (three) months.    ondansetron (ZOFRAN) 4 MG tablet Take 4 mg by mouth every 6 (six) hours as needed.    amitriptyline (ELAVIL) 25 MG tablet Take 1 tablet by mouth every evening.    cholecalciferol, vitamin D3, (VITAMIN D3) 25 mcg (1,000 unit) capsule Take 2 capsules (2,000 Units total) by mouth once daily.    diazePAM (VALIUM) 5 MG tablet Take 1 tablet (5mg) by mouth an hour before MRI can take another tablet (5mg) at time of MRI if needed (Patient not taking: Reported on 11/13/2024)    dicyclomine (BENTYL) 10 MG capsule Take 10 mg by mouth 4 (four) times daily before meals and nightly. (Patient not taking: Reported on 11/13/2024)    estradioL (ESTRACE) 1 MG tablet Take 1 tablet (1 mg total) by mouth once daily. (Patient not taking: Reported on 11/13/2024)    rimegepant (NURTEC) 75 mg odt Nurtec     No current facility-administered medications for this visit.         Review of Systems:  Review of Systems   Constitutional:  Negative for activity change, appetite change, chills, fatigue, fever and unexpected weight change.   Respiratory:  Negative for cough, shortness of breath and wheezing.    Cardiovascular:  Negative for chest pain and leg swelling.   Gastrointestinal:  Negative for abdominal pain, constipation, diarrhea, nausea and vomiting.   Endocrine: Negative for hair loss and hot flashes.   Genitourinary:  Negative for decreased libido, dyspareunia, dysuria, frequency, menstrual problem, pelvic pain, vaginal bleeding, vaginal discharge and vaginal pain.   Integumentary:  Negative for acne, hair changes, nipple discharge and breast skin changes.   Neurological:  Negative for headaches.   Psychiatric/Behavioral:  Negative for sleep disturbance.    Breast: Negative for mastodynia, nipple discharge and skin changes       OBJECTIVE:     Physical Exam:  Physical Exam  Constitutional:       Appearance: She is well-developed.   HENT:      Head: Normocephalic and  atraumatic.   Eyes:      General: No scleral icterus.        Right eye: No discharge.         Left eye: No discharge.      Conjunctiva/sclera: Conjunctivae normal.   Pulmonary:      Effort: Pulmonary effort is normal.      Breath sounds: No stridor.   Chest:      Chest wall: No mass or tenderness.   Breasts:     Breasts are symmetrical.      Right: No inverted nipple, mass, nipple discharge, skin change or tenderness.      Left: No inverted nipple, mass, nipple discharge, skin change or tenderness.   Abdominal:      General: There is no distension.      Palpations: Abdomen is soft.      Tenderness: There is no abdominal tenderness.   Genitourinary:     Labia:         Right: No rash, tenderness, lesion or injury.         Left: No rash, tenderness, lesion or injury.       Vagina: Normal.      Cervix: No cervical motion tenderness, discharge or friability.      Adnexa:         Right: No mass, tenderness or fullness.          Left: No mass, tenderness or fullness.        Comments: Normal external genitalia.  Normal hair distribution.  Urethral meatus normal. No cervical lesions or masses.  No vaginal bleeding noted.  No adnexal or uterine tenderness.  No palpable adnexal masses.  Musculoskeletal:         General: Normal range of motion.   Skin:     General: Skin is warm and dry.   Neurological:      Mental Status: She is alert and oriented to person, place, and time.   Psychiatric:         Behavior: Behavior normal.         Thought Content: Thought content normal.         Judgment: Judgment normal.           ASSESSMENT:       ICD-10-CM ICD-9-CM    1. Encounter for well woman exam with routine gynecological exam  Z01.419 V72.31 Liquid-Based Pap Smear, Screening      HPV High Risk Genotypes, PCR      2. Encounter for initial prescription of intrauterine contraceptive device (IUD)  Z30.014 V25.02                  Plan:      Soila was seen today for gynecologic exam.    Diagnoses and all orders for this visit:    Encounter  for well woman exam with routine gynecological exam  - Pap smear today.  Will do with HPV cotesting, given last pap smear  - If abnormal, consider colpo  - MMG and Cscope not indicated at this time  -     Liquid-Based Pap Smear, Screening  -     HPV High Risk Genotypes, PCR    Encounter for initial prescription of intrauterine contraceptive device (IUD)  -  Would like to get Mirena IUD.  - Will order and schedule for placement        Orders Placed This Encounter   Procedures    HPV High Risk Genotypes, PCR       No follow-ups on file.    Counseling time: 15 minutes    Celina Hardin

## 2024-11-15 ENCOUNTER — TELEPHONE (OUTPATIENT)
Dept: OBSTETRICS AND GYNECOLOGY | Facility: CLINIC | Age: 24
End: 2024-11-15
Payer: MEDICAID

## 2024-11-15 ENCOUNTER — PATIENT MESSAGE (OUTPATIENT)
Dept: OBSTETRICS AND GYNECOLOGY | Facility: CLINIC | Age: 24
End: 2024-11-15
Payer: MEDICAID

## 2024-11-15 DIAGNOSIS — Z30.014 ENCOUNTER FOR INITIAL PRESCRIPTION OF INTRAUTERINE CONTRACEPTIVE DEVICE (IUD): Primary | ICD-10-CM

## 2024-11-20 ENCOUNTER — PATIENT MESSAGE (OUTPATIENT)
Dept: OBSTETRICS AND GYNECOLOGY | Facility: CLINIC | Age: 24
End: 2024-11-20
Payer: MEDICAID

## 2024-11-25 ENCOUNTER — OFFICE VISIT (OUTPATIENT)
Dept: OBSTETRICS AND GYNECOLOGY | Facility: CLINIC | Age: 24
End: 2024-11-25
Payer: MEDICAID

## 2024-11-25 VITALS
WEIGHT: 138.25 LBS | HEIGHT: 63 IN | DIASTOLIC BLOOD PRESSURE: 76 MMHG | BODY MASS INDEX: 24.5 KG/M2 | SYSTOLIC BLOOD PRESSURE: 98 MMHG

## 2024-11-25 DIAGNOSIS — Z30.430 ENCOUNTER FOR IUD INSERTION: Primary | ICD-10-CM

## 2024-11-25 LAB
B-HCG UR QL: NEGATIVE
CTP QC/QA: YES

## 2024-11-25 PROCEDURE — 99212 OFFICE O/P EST SF 10 MIN: CPT | Mod: PBBFAC | Performed by: OBSTETRICS & GYNECOLOGY

## 2024-11-25 PROCEDURE — 99999PBSHW PR PBB SHADOW TECHNICAL ONLY FILED TO HB: Mod: PBBFAC,,,

## 2024-11-25 PROCEDURE — 99999 PR PBB SHADOW E&M-EST. PATIENT-LVL II: CPT | Mod: PBBFAC,,, | Performed by: OBSTETRICS & GYNECOLOGY

## 2024-11-25 PROCEDURE — 99999PBSHW POCT URINE PREGNANCY: Mod: PBBFAC,,,

## 2024-11-25 PROCEDURE — 58300 INSERT INTRAUTERINE DEVICE: CPT | Mod: PBBFAC | Performed by: OBSTETRICS & GYNECOLOGY

## 2024-11-25 PROCEDURE — 99499 UNLISTED E&M SERVICE: CPT | Mod: 25,S$PBB,, | Performed by: OBSTETRICS & GYNECOLOGY

## 2024-11-25 PROCEDURE — 81025 URINE PREGNANCY TEST: CPT | Mod: PBBFAC | Performed by: OBSTETRICS & GYNECOLOGY

## 2024-11-25 RX ADMIN — LEVONORGESTREL 1 INTRA UTERINE DEVICE: 52 INTRAUTERINE DEVICE INTRAUTERINE at 11:11

## 2024-11-25 NOTE — PROCEDURES
Insertion of IUD    Date/Time: 2024 11:30 AM    Performed by: Celina Hardin MD  Authorized by: Celina Hardin MD    Consent:     Consent given by:  Patient    Procedure risks and benefits discussed: yes      Patient questions answered: yes      Patient agrees, verbalizes understanding, and wants to proceed: yes     Device to be inserted was verified by patient: yes    Educational handouts given: yes      Instructions and paperwork completed: yes    Insertion Procedure:   1 Intra Uterine Device levonorgestreL 52 mg       Pelvic exam performed: yes      Negative GC/chlamydia test: no      Negative urine pregnancy test: yes      Negative serum pregnancy test: no      Cervix cleaned and prepped: yes      Speculum placed in vagina: yes      Tenaculum applied to cervix: yes      Uterus sounded: yes      Uterus sound depth (cm):  8    IUD inserted with no complications: yes      IUD type:  Mirena    Strings trimmed: yes    Post-procedure:     Patient tolerated procedure well: yes      Patient will follow up after next period: yes          Soila Ridley is a 24 y.o. female  presents for IUD placement.  Patient's last menstrual period was 2024 (approximate)..  She desires Mirena.  UPT is negative.      She was counseled on the risks, benefits, indications, and alternatives to IUD use.  She understands that with insertion there is a risk of bleeding, infection, and uterine perforation.  All questions are answered.  Consents signed.  Cervical cultures were not performed.    Procedure:  Time out performed.  The cervix was visualized with a speculum.  A single tooth tenaculum was placed on the anterior lip of the cervix.  The uterus sounds to 8cm using sterile technique.  A Mirena was loaded and placed high in the uterine fundus without difficulty using sterile technique.  The string was was then cut.  The tenaculum and speculum were removed.  The patient tolerated the procedure  well.    Assessment:  1.  Contraceptive management/IUD insertion    Post IUD placement counseling:  Manage post IUD placement pain with NSAIDS, Tylenol or Rx per [facility].  IUD danger signs and how to check for strings were discussed.  The IUD needs to be removed in 8 years for Mirena and 10 years for Copper IUD.    Counseling lasted approximately 15 minutes and all her questions were answered.    Follow up:  4 weeks.      Celina Hardin MD  Obstetrics & Gynecology

## 2024-12-16 ENCOUNTER — PATIENT MESSAGE (OUTPATIENT)
Dept: NEUROLOGY | Facility: CLINIC | Age: 24
End: 2024-12-16
Payer: MEDICAID

## 2024-12-20 ENCOUNTER — TELEPHONE (OUTPATIENT)
Dept: NEUROLOGY | Facility: CLINIC | Age: 24
End: 2024-12-20
Payer: MEDICAID

## 2025-02-04 ENCOUNTER — LAB VISIT (OUTPATIENT)
Dept: LAB | Facility: HOSPITAL | Age: 25
End: 2025-02-04
Payer: MEDICAID

## 2025-02-04 ENCOUNTER — OFFICE VISIT (OUTPATIENT)
Dept: NEUROLOGY | Facility: CLINIC | Age: 25
End: 2025-02-04
Payer: MEDICAID

## 2025-02-04 VITALS
DIASTOLIC BLOOD PRESSURE: 73 MMHG | SYSTOLIC BLOOD PRESSURE: 110 MMHG | HEART RATE: 87 BPM | HEIGHT: 63 IN | BODY MASS INDEX: 25.29 KG/M2 | WEIGHT: 142.75 LBS

## 2025-02-04 DIAGNOSIS — R93.0 RADIOLOGICALLY ISOLATED SYNDROME: ICD-10-CM

## 2025-02-04 DIAGNOSIS — E53.8 B12 DEFICIENCY: ICD-10-CM

## 2025-02-04 DIAGNOSIS — R93.0 RADIOLOGICALLY ISOLATED SYNDROME: Primary | ICD-10-CM

## 2025-02-04 DIAGNOSIS — E55.9 VITAMIN D DEFICIENCY: ICD-10-CM

## 2025-02-04 LAB — VIT B12 SERPL-MCNC: 504 PG/ML (ref 210–950)

## 2025-02-04 PROCEDURE — 0361U NEURFLMNT LT CHN DIG IA QUAN: CPT

## 2025-02-04 PROCEDURE — G2211 COMPLEX E/M VISIT ADD ON: HCPCS | Mod: S$PBB,,,

## 2025-02-04 PROCEDURE — 3008F BODY MASS INDEX DOCD: CPT | Mod: CPTII,,,

## 2025-02-04 PROCEDURE — 99215 OFFICE O/P EST HI 40 MIN: CPT | Mod: S$PBB,,,

## 2025-02-04 PROCEDURE — 99213 OFFICE O/P EST LOW 20 MIN: CPT | Mod: PBBFAC

## 2025-02-04 PROCEDURE — 82607 VITAMIN B-12: CPT

## 2025-02-04 PROCEDURE — 36415 COLL VENOUS BLD VENIPUNCTURE: CPT

## 2025-02-04 PROCEDURE — 3074F SYST BP LT 130 MM HG: CPT | Mod: CPTII,,,

## 2025-02-04 PROCEDURE — 83520 IMMUNOASSAY QUANT NOS NONAB: CPT

## 2025-02-04 PROCEDURE — 3078F DIAST BP <80 MM HG: CPT | Mod: CPTII,,,

## 2025-02-04 PROCEDURE — 1159F MED LIST DOCD IN RCRD: CPT | Mod: CPTII,,,

## 2025-02-04 PROCEDURE — 99999 PR PBB SHADOW E&M-EST. PATIENT-LVL III: CPT | Mod: PBBFAC,,,

## 2025-02-04 PROCEDURE — 1160F RVW MEDS BY RX/DR IN RCRD: CPT | Mod: CPTII,,,

## 2025-02-04 PROCEDURE — 82306 VITAMIN D 25 HYDROXY: CPT

## 2025-02-04 RX ORDER — DICLOFENAC SODIUM 50 MG/1
50 TABLET, DELAYED RELEASE ORAL DAILY PRN
COMMUNITY
Start: 2024-12-24

## 2025-02-04 NOTE — PROGRESS NOTES
"Patient ID: Soila Ridley is a 24 y.o. female who presents today for a routine clinic visit for MS.  She was last seen by Dr. Johnson on 2/14/2024.  The history was provided by the patient. She is accompanied by her daughter.      Principle neurological diagnosis: RIS  Date of symptom onset: 12/2022  Date of diagnosis: 5/2023  Disease type at diagnosis: RIS  Disease type currently: RIS  Previous therapy: NA  Current therapy: NA  Vitamin D Dose: 2000IU daily   Last MRI Brain: 6/12/2024 - stable  Last MRI C-spine: 6/15/2023 - no lesions  Last MRI T-spine: NA  CSF 01/31/2023:  2 white, 0 red, protein 26, glucose 48, 2 unique oligoclonal bands, normal myelin basic protein, IgG index 0.37  JCV status: NA  Other relevant labs and tests: 5/11/2023: vitamin D - 41, NMO and MOG - negative, B12 - 172     Subjective:     She is still having headaches, but she is seeing a headache specialist and did get "nerve block" injections in the back of her head, which did help, but she thinks she is due for more injections.     She is still experiencing anxiety, but is not sure she would like to start medication for it.  She is trying to manage as much as she can. She is trying to juggle home life (with a 3 year old), work, and school.      She does find that she chokes on liquids from time to time.  It is not often, but is just something she has noticed. She also noticed that her mom and grandmother do the same thing.     She denies weakness, sensory changes, dysphagia, dysarthria, spasticity, visual changes, cognitive changes, mood disorder, gait disturbance, incoordination, pain, heat sensitivity, fatigue, bladder and bowel dysfunction, sexual dysfunction.        SOCIAL HISTORY  Living arrangements - the patient lives with their family.  Social History     Socioeconomic History    Marital status: Single   Tobacco Use    Smoking status: Never    Smokeless tobacco: Never   Substance and Sexual Activity    Alcohol use: No    Drug use: No "    Sexual activity: Yes     Partners: Male     Birth control/protection: Injection   Social History Narrative    Lives with parents and 1 brother and 1 sister.    2 dogs.     Social Drivers of Health     Financial Resource Strain: Low Risk  (2/3/2025)    Overall Financial Resource Strain (CARDIA)     Difficulty of Paying Living Expenses: Not hard at all   Food Insecurity: No Food Insecurity (2/3/2025)    Hunger Vital Sign     Worried About Running Out of Food in the Last Year: Never true     Ran Out of Food in the Last Year: Never true   Physical Activity: Sufficiently Active (2/3/2025)    Exercise Vital Sign     Days of Exercise per Week: 3 days     Minutes of Exercise per Session: 60 min   Stress: No Stress Concern Present (2/3/2025)    [employer] - Occupational Stress Questionnaire     Feeling of Stress : Only a little   Housing Stability: Unknown (2/3/2025)    Housing Stability Vital Sign     Unable to Pay for Housing in the Last Year: Patient declined       Current Outpatient Medications on File Prior to Visit   Medication Sig Dispense Refill    butalbital-acetaminophen-caffeine -40 mg (FIORICET, ESGIC) -40 mg per tablet Take 1 tablet by mouth 2 (two) times daily as needed.      cetirizine (ZYRTEC) 10 MG tablet Take 10 mg by mouth.      diclofenac (VOLTAREN) 50 MG EC tablet Take 50 mg by mouth daily as needed.      ondansetron (ZOFRAN) 4 MG tablet Take 4 mg by mouth every 6 (six) hours as needed.       Current Facility-Administered Medications on File Prior to Visit   Medication Dose Route Frequency Provider Last Rate Last Admin    levonorgestreL (Mirena) 52 mg IUD 1 Intra Uterine Device  1 Intra Uterine Device Intrauterine     1 Intra Uterine Device at 11/25/24 1130       Objective:     1. 25 foot timed walk:      2/14/2024     2:00 PM 2/4/2025     2:00 PM   Timed 25 Foot Walk:   Did patient wear an AFO? No No   Was assistive device used? No No   Time for 25 Foot Walk  (seconds) 4.23 3.5   Time for 25 Foot Walk (seconds) 4.3 4.26           NEURO EXAM    In general, the patient is well nourished and appears to be in no acute distress.    MENTAL STATUS: language is fluent, normal verbal comprehension, short-term and remote memory is intact, attention is normal, patient is alert and oriented x 3, fund of knowlege is appropriate by vocabulary.     CRANIAL NERVE EXAM:  There is no internuclear ophthalmoplegia.  Extraocular muscles are intact. No facial asymmetry. Facial sensation is intact bilaterally. There is no dysarthria. Uvula is midline, and palate moves symmetrically. Shoulder shrug intact bilaterally. Tongue protrusion is midline. Hearing is intact to finger rub bilaterally. Neck is supple with full ROM    MOTOR EXAM: Normal bulk and tone throughout UE and LE bilaterally. Rapid sequential movements are normal; Strength is  5/5 in all groups in the lower extremities and upper extremities    REFLEXES: 2+ and symmetric throughout in all four extremities    SENSORY EXAM: Normal to light touch and vibration throughout.    COORDINATION: Normal finger-to-nose exam. Normal heel-to-shin exam.    GAIT: Narrow based and stable.     Negative Romberg's    Imaging:     Personally reviewed.     Results for orders placed during the hospital encounter of 06/12/24    MRI Brain Demyelinating Without Contrast    Impression  No significant change from prior with stable clustered T2 flair signal abnormality left frontal periventricular white matter.  This remains nonspecific and in light of history may be sequela of prior demyelination    No evidence for new lesion or new signal abnormality to suggest interval or active demyelination.    Clinical correlation and continued follow-up advised      Electronically signed by: João Toribio DO  Date:    06/12/2024  Time:    14:51    No results found for this or any previous visit.    No results found for this or any previous visit.    Results for orders  "placed during the hospital encounter of 06/15/23    MRI Brain Demyelinating W W/O Contrast    Impression  Stable areas of T2/FLAIR signal within the periventricular white matter of the left frontal lobe.  These are not entirely specific but may relate to areas of prior demyelination in the appropriate clinical setting.  No new lesions.  No abnormal enhancement to suggest active demyelination.    No evidence of active or remote demyelination in the cervical spine.    Electronically signed by resident: Luis Fernando Torres  Date:    06/15/2023  Time:    16:42    Electronically signed by: Darryl Alvarado MD  Date:    06/16/2023  Time:    08:30    Results for orders placed during the hospital encounter of 06/15/23    MRI Cervical Spine Demyelinating W W/O Contrast    Impression  Stable areas of T2/FLAIR signal within the periventricular white matter of the left frontal lobe.  These are not entirely specific but may relate to areas of prior demyelination in the appropriate clinical setting.  No new lesions.  No abnormal enhancement to suggest active demyelination.    No evidence of active or remote demyelination in the cervical spine.    Electronically signed by resident: Luis Fernando Torres  Date:    06/15/2023  Time:    16:42    Electronically signed by: Darryl Alvarado MD  Date:    06/16/2023  Time:    08:30    No results found for this or any previous visit.        Labs:     Lab Results   Component Value Date    SXERJEYD85LG 26 (L) 02/04/2025    [id number] 41 05/11/2023     No results found for: "JCVINDEX", "JCVANTIBODY"  No results found for: "OB2QZAJD", "ABSOLUTECD3", "GX9HMYBJ", "ABSOLUTECD8", "BG0NLIQY", "ABSOLUTECD4", "LABCD48"  Lab Results   Component Value Date    WBC 5.76 08/16/2023    RBC 4.34 08/16/2023    HGB 13.3 08/16/2023    HCT 39.9 08/16/2023    MCV 92 08/16/2023    MCH 30.6 08/16/2023    MCHC 33.3 08/16/2023    RDW 11.9 08/16/2023     08/16/2023    MPV 9.0 (L) 08/16/2023    GRAN 3.3 08/16/2023    " GRAN 56.6 08/16/2023    LYMPH 1.9 08/16/2023    LYMPH 33.5 08/16/2023    MONO 0.5 08/16/2023    MONO 8.5 08/16/2023    EOS 0.1 08/16/2023    BASO 0.02 08/16/2023    EOSINOPHIL 0.9 08/16/2023    BASOPHIL 0.3 08/16/2023     Sodium   Date Value Ref Range Status   08/16/2023 139 136 - 145 mmol/L Final     Potassium   Date Value Ref Range Status   08/16/2023 3.7 3.5 - 5.1 mmol/L Final     Chloride   Date Value Ref Range Status   08/16/2023 107 95 - 110 mmol/L Final     CO2   Date Value Ref Range Status   08/16/2023 22 (L) 23 - 29 mmol/L Final     Glucose   Date Value Ref Range Status   08/16/2023 115 (H) 70 - 110 mg/dL Final     BUN   Date Value Ref Range Status   08/16/2023 8 6 - 20 mg/dL Final     Creatinine   Date Value Ref Range Status   08/16/2023 0.9 0.5 - 1.4 mg/dL Final     Calcium   Date Value Ref Range Status   08/16/2023 9.6 8.7 - 10.5 mg/dL Final     Total Protein   Date Value Ref Range Status   08/16/2023 8.1 6.0 - 8.4 g/dL Final     Albumin   Date Value Ref Range Status   08/16/2023 4.2 3.5 - 5.2 g/dL Final     Total Bilirubin   Date Value Ref Range Status   08/16/2023 0.4 0.1 - 1.0 mg/dL Final     Comment:     For infants and newborns, interpretation of results should be based  on gestational age, weight and in agreement with clinical  observations.    Premature Infant recommended reference ranges:  Up to 24 hours.............<8.0 mg/dL  Up to 48 hours............<12.0 mg/dL  3-5 days..................<15.0 mg/dL  6-29 days.................<15.0 mg/dL       Alkaline Phosphatase   Date Value Ref Range Status   08/16/2023 62 55 - 135 U/L Final     AST   Date Value Ref Range Status   08/16/2023 17 10 - 40 U/L Final     ALT   Date Value Ref Range Status   08/16/2023 14 10 - 44 U/L Final     Anion Gap   Date Value Ref Range Status   08/16/2023 10 8 - 16 mmol/L Final     eGFR if    Date Value Ref Range Status   04/04/2021 >60 >60 mL/min/1.73 m^2 Final     eGFR if non    Date Value  Ref Range Status   04/04/2021 >60 >60 mL/min/1.73 m^2 Final     Comment:     Calculation used to obtain the estimated glomerular filtration  rate (eGFR) is the CKD-EPI equation.        Lab Results   Component Value Date    HEPBSAG Negative 03/15/2021           MS Impression and Plan:     NEURO MULTIPLE SCLEROSIS IMPRESSION:   Number of relapses in the past year?:  0  Clinical Progression:  Clinically Stable  MRI Progression:  Stable  MS Classification:  Radiologically isolated syndrome  Current DMT: none  DMT:  No change in management  Symptom Management:  Implement change in symptom management  Implement Change in Symptom Management:  Fatigue and Cognitive  Implement Change in Symptom Management comment:   Fatigue: try CoQ10 daily   Cognition: discussed the possible benefits from [employer]  Additional Impressions:   Patient is remaining clinically and radiologically stable, continue to monitor off DMT   Labs today - vit D, and B12, GFAP, NfL  Brain MRI W/O in June - will order Valium closer to date of MRI  Follow up in 6 months with Dr. Johnson  Plan discussed and questions were answered to satisfaction.     Problem List Items Addressed This Visit       Radiologically isolated syndrome - Primary    Relevant Orders    Neurofilament Light Chain    GFAP, Plasma    MRI Brain Demyelinating Without Contrast    Vitamin B12 (Completed)     Other Visit Diagnoses       B12 deficiency        Relevant Orders    Vitamin B12 (Completed)    Vitamin D deficiency                I spent a total of 60 minutes on the day of the visit.This includes face to face time and non-face to face time preparing to see the patient (eg, review of tests), obtaining and/or reviewing separately obtained history, documenting clinical information in the electronic or other health record, independently interpreting results and communicating results to the patient/family/caregiver, or care coordinator.       IZAIAH Goldstein

## 2025-02-05 ENCOUNTER — PATIENT MESSAGE (OUTPATIENT)
Dept: NEUROLOGY | Facility: CLINIC | Age: 25
End: 2025-02-05
Payer: MEDICAID

## 2025-02-05 DIAGNOSIS — E55.9 VITAMIN D DEFICIENCY: ICD-10-CM

## 2025-02-05 DIAGNOSIS — R93.0 RADIOLOGICALLY ISOLATED SYNDROME: Primary | ICD-10-CM

## 2025-02-05 LAB — 25(OH)D3+25(OH)D2 SERPL-MCNC: 26 NG/ML (ref 30–96)

## 2025-02-05 RX ORDER — ERGOCALCIFEROL 1.25 MG/1
50000 CAPSULE ORAL
Qty: 12 CAPSULE | Refills: 3 | Status: SHIPPED | OUTPATIENT
Start: 2025-02-05 | End: 2026-02-05

## 2025-02-06 ENCOUNTER — PATIENT MESSAGE (OUTPATIENT)
Dept: NEUROLOGY | Facility: CLINIC | Age: 25
End: 2025-02-06
Payer: MEDICAID

## 2025-02-06 LAB — NEUROFILAMENT LIGHT CHAIN, PLASMA: <3 PG/ML

## 2025-02-08 LAB — GFAP, PLASMA: 25.2 PG/ML (ref 0–57.4)

## 2025-03-11 ENCOUNTER — PATIENT MESSAGE (OUTPATIENT)
Dept: OBSTETRICS AND GYNECOLOGY | Facility: CLINIC | Age: 25
End: 2025-03-11
Payer: MEDICAID

## 2025-03-11 DIAGNOSIS — R10.2 PELVIC PAIN: Primary | ICD-10-CM

## 2025-03-13 ENCOUNTER — RESULTS FOLLOW-UP (OUTPATIENT)
Dept: OBSTETRICS AND GYNECOLOGY | Facility: CLINIC | Age: 25
End: 2025-03-13

## 2025-03-13 ENCOUNTER — HOSPITAL ENCOUNTER (OUTPATIENT)
Dept: RADIOLOGY | Facility: HOSPITAL | Age: 25
Discharge: HOME | End: 2025-03-13
Attending: OBSTETRICS & GYNECOLOGY
Payer: MEDICAID

## 2025-03-13 DIAGNOSIS — R10.2 PELVIC PAIN: ICD-10-CM

## 2025-03-13 PROCEDURE — 76830 TRANSVAGINAL US NON-OB: CPT | Mod: TC

## 2025-03-13 PROCEDURE — 76856 US EXAM PELVIC COMPLETE: CPT | Mod: 26,,, | Performed by: RADIOLOGY

## 2025-03-13 PROCEDURE — 76830 TRANSVAGINAL US NON-OB: CPT | Mod: 26,,, | Performed by: RADIOLOGY

## 2025-05-30 ENCOUNTER — PATIENT MESSAGE (OUTPATIENT)
Dept: NEUROLOGY | Facility: CLINIC | Age: 25
End: 2025-05-30

## 2025-05-30 DIAGNOSIS — R93.0 RADIOLOGICALLY ISOLATED SYNDROME: ICD-10-CM

## 2025-05-30 DIAGNOSIS — F40.240 CLAUSTROPHOBIA: ICD-10-CM

## 2025-06-02 ENCOUNTER — OFFICE VISIT (OUTPATIENT)
Dept: URGENT CARE | Facility: CLINIC | Age: 25
End: 2025-06-02
Payer: MEDICAID

## 2025-06-02 VITALS
TEMPERATURE: 98 F | BODY MASS INDEX: 25.16 KG/M2 | HEIGHT: 63 IN | DIASTOLIC BLOOD PRESSURE: 73 MMHG | OXYGEN SATURATION: 98 % | WEIGHT: 142 LBS | HEART RATE: 82 BPM | RESPIRATION RATE: 17 BRPM | SYSTOLIC BLOOD PRESSURE: 103 MMHG

## 2025-06-02 DIAGNOSIS — J06.9 VIRAL URI WITH COUGH: Primary | ICD-10-CM

## 2025-06-02 DIAGNOSIS — R09.81 CONGESTION OF NASAL SINUS: ICD-10-CM

## 2025-06-02 DIAGNOSIS — J02.9 SORE THROAT: ICD-10-CM

## 2025-06-02 LAB
CTP QC/QA: YES
MOLECULAR STREP A: NEGATIVE
POC MOLECULAR INFLUENZA A AGN: NEGATIVE
POC MOLECULAR INFLUENZA B AGN: NEGATIVE
SARS-COV+SARS-COV-2 AG RESP QL IA.RAPID: NEGATIVE

## 2025-06-02 PROCEDURE — 87651 STREP A DNA AMP PROBE: CPT | Mod: QW,S$GLB,,

## 2025-06-02 PROCEDURE — 87811 SARS-COV-2 COVID19 W/OPTIC: CPT | Mod: QW,S$GLB,,

## 2025-06-02 PROCEDURE — 87502 INFLUENZA DNA AMP PROBE: CPT | Mod: QW,S$GLB,,

## 2025-06-02 PROCEDURE — 99214 OFFICE O/P EST MOD 30 MIN: CPT | Mod: S$GLB,,,

## 2025-06-02 RX ORDER — GUAIFENESIN AND DEXTROMETHORPHAN HYDROBROMIDE 1200; 60 MG/1; MG/1
1 TABLET, EXTENDED RELEASE ORAL 2 TIMES DAILY
Qty: 20 TABLET | Refills: 0 | Status: SHIPPED | OUTPATIENT
Start: 2025-06-02 | End: 2025-06-12

## 2025-06-02 RX ORDER — DIAZEPAM 5 MG/1
TABLET ORAL
Qty: 2 TABLET | Refills: 0 | Status: SHIPPED | OUTPATIENT
Start: 2025-06-02

## 2025-06-02 RX ORDER — FLUTICASONE PROPIONATE 50 MCG
1 SPRAY, SUSPENSION (ML) NASAL 2 TIMES DAILY
Qty: 15.8 ML | Refills: 0 | Status: SHIPPED | OUTPATIENT
Start: 2025-06-02 | End: 2025-07-02

## 2025-06-02 RX ORDER — PROMETHAZINE HYDROCHLORIDE AND DEXTROMETHORPHAN HYDROBROMIDE 6.25; 15 MG/5ML; MG/5ML
5 SYRUP ORAL NIGHTLY PRN
Qty: 180 ML | Refills: 0 | Status: SHIPPED | OUTPATIENT
Start: 2025-06-02 | End: 2025-07-08

## 2025-06-09 ENCOUNTER — PATIENT MESSAGE (OUTPATIENT)
Dept: NEUROLOGY | Facility: CLINIC | Age: 25
End: 2025-06-09
Payer: MEDICAID

## 2025-06-09 DIAGNOSIS — R93.0 RADIOLOGICALLY ISOLATED SYNDROME: ICD-10-CM

## 2025-06-09 DIAGNOSIS — F40.240 CLAUSTROPHOBIA: ICD-10-CM

## 2025-06-09 RX ORDER — DIAZEPAM 5 MG/1
TABLET ORAL
Qty: 2 TABLET | Refills: 0 | OUTPATIENT
Start: 2025-06-09

## 2025-06-10 ENCOUNTER — HOSPITAL ENCOUNTER (OUTPATIENT)
Dept: RADIOLOGY | Facility: HOSPITAL | Age: 25
Discharge: HOME | End: 2025-06-10
Payer: MEDICAID

## 2025-06-10 DIAGNOSIS — R93.0 RADIOLOGICALLY ISOLATED SYNDROME: ICD-10-CM

## 2025-06-10 PROCEDURE — 70551 MRI BRAIN STEM W/O DYE: CPT | Mod: 26,,, | Performed by: STUDENT IN AN ORGANIZED HEALTH CARE EDUCATION/TRAINING PROGRAM

## 2025-06-10 PROCEDURE — 70551 MRI BRAIN STEM W/O DYE: CPT | Mod: TC

## 2025-06-11 ENCOUNTER — RESULTS FOLLOW-UP (OUTPATIENT)
Dept: NEUROLOGY | Facility: CLINIC | Age: 25
End: 2025-06-11

## 2025-06-17 ENCOUNTER — PATIENT MESSAGE (OUTPATIENT)
Dept: OBSTETRICS AND GYNECOLOGY | Facility: CLINIC | Age: 25
End: 2025-06-17
Payer: MEDICAID

## 2025-07-14 ENCOUNTER — PATIENT MESSAGE (OUTPATIENT)
Dept: OBSTETRICS AND GYNECOLOGY | Facility: CLINIC | Age: 25
End: 2025-07-14
Payer: MEDICAID

## 2025-07-29 ENCOUNTER — PATIENT MESSAGE (OUTPATIENT)
Dept: OBSTETRICS AND GYNECOLOGY | Facility: CLINIC | Age: 25
End: 2025-07-29
Payer: MEDICAID

## 2025-08-09 ENCOUNTER — PATIENT MESSAGE (OUTPATIENT)
Dept: OBSTETRICS AND GYNECOLOGY | Facility: CLINIC | Age: 25
End: 2025-08-09
Payer: MEDICAID

## 2025-08-14 ENCOUNTER — OFFICE VISIT (OUTPATIENT)
Dept: NEUROLOGY | Facility: CLINIC | Age: 25
End: 2025-08-14
Payer: MEDICAID

## 2025-08-14 VITALS
WEIGHT: 134.06 LBS | HEIGHT: 62 IN | DIASTOLIC BLOOD PRESSURE: 65 MMHG | BODY MASS INDEX: 24.67 KG/M2 | SYSTOLIC BLOOD PRESSURE: 99 MMHG | HEART RATE: 86 BPM

## 2025-08-14 DIAGNOSIS — R79.0 LOW FERRITIN: ICD-10-CM

## 2025-08-14 DIAGNOSIS — R93.0 RADIOLOGICALLY ISOLATED SYNDROME: Primary | ICD-10-CM

## 2025-08-14 PROCEDURE — G2211 COMPLEX E/M VISIT ADD ON: HCPCS | Mod: ,,, | Performed by: STUDENT IN AN ORGANIZED HEALTH CARE EDUCATION/TRAINING PROGRAM

## 2025-08-14 PROCEDURE — 1160F RVW MEDS BY RX/DR IN RCRD: CPT | Mod: CPTII,,, | Performed by: STUDENT IN AN ORGANIZED HEALTH CARE EDUCATION/TRAINING PROGRAM

## 2025-08-14 PROCEDURE — 3078F DIAST BP <80 MM HG: CPT | Mod: CPTII,,, | Performed by: STUDENT IN AN ORGANIZED HEALTH CARE EDUCATION/TRAINING PROGRAM

## 2025-08-14 PROCEDURE — 1159F MED LIST DOCD IN RCRD: CPT | Mod: CPTII,,, | Performed by: STUDENT IN AN ORGANIZED HEALTH CARE EDUCATION/TRAINING PROGRAM

## 2025-08-14 PROCEDURE — 99999 PR PBB SHADOW E&M-EST. PATIENT-LVL III: CPT | Mod: PBBFAC,,, | Performed by: STUDENT IN AN ORGANIZED HEALTH CARE EDUCATION/TRAINING PROGRAM

## 2025-08-14 PROCEDURE — 99213 OFFICE O/P EST LOW 20 MIN: CPT | Mod: PBBFAC | Performed by: STUDENT IN AN ORGANIZED HEALTH CARE EDUCATION/TRAINING PROGRAM

## 2025-08-14 PROCEDURE — 3008F BODY MASS INDEX DOCD: CPT | Mod: CPTII,,, | Performed by: STUDENT IN AN ORGANIZED HEALTH CARE EDUCATION/TRAINING PROGRAM

## 2025-08-14 PROCEDURE — 99215 OFFICE O/P EST HI 40 MIN: CPT | Mod: S$PBB,,, | Performed by: STUDENT IN AN ORGANIZED HEALTH CARE EDUCATION/TRAINING PROGRAM

## 2025-08-14 PROCEDURE — 3074F SYST BP LT 130 MM HG: CPT | Mod: CPTII,,, | Performed by: STUDENT IN AN ORGANIZED HEALTH CARE EDUCATION/TRAINING PROGRAM

## 2025-08-15 ENCOUNTER — OFFICE VISIT (OUTPATIENT)
Dept: OBSTETRICS AND GYNECOLOGY | Facility: CLINIC | Age: 25
End: 2025-08-15
Payer: MEDICAID

## 2025-08-15 VITALS
WEIGHT: 134.5 LBS | HEIGHT: 62 IN | BODY MASS INDEX: 24.75 KG/M2 | SYSTOLIC BLOOD PRESSURE: 107 MMHG | DIASTOLIC BLOOD PRESSURE: 71 MMHG

## 2025-08-15 DIAGNOSIS — Z30.432 ENCOUNTER FOR IUD REMOVAL: Primary | ICD-10-CM

## 2025-08-15 DIAGNOSIS — Z30.09 BIRTH CONTROL COUNSELING: ICD-10-CM

## 2025-08-15 PROCEDURE — 99999 PR PBB SHADOW E&M-EST. PATIENT-LVL I: CPT | Mod: PBBFAC,,,

## 2025-08-15 PROCEDURE — 99211 OFF/OP EST MAY X REQ PHY/QHP: CPT | Mod: PBBFAC

## 2025-08-15 RX ORDER — NORETHINDRONE ACETATE AND ETHINYL ESTRADIOL 1MG-20(21)
1 KIT ORAL DAILY
Qty: 28 TABLET | Refills: 12 | Status: SHIPPED | OUTPATIENT
Start: 2025-08-15 | End: 2026-08-14

## 2025-08-18 ENCOUNTER — PATIENT MESSAGE (OUTPATIENT)
Dept: OBSTETRICS AND GYNECOLOGY | Facility: CLINIC | Age: 25
End: 2025-08-18
Payer: MEDICAID

## 2025-08-18 ENCOUNTER — PATIENT MESSAGE (OUTPATIENT)
Dept: PSYCHIATRY | Facility: CLINIC | Age: 25
End: 2025-08-18
Payer: MEDICAID